# Patient Record
Sex: MALE | Race: WHITE | NOT HISPANIC OR LATINO | URBAN - METROPOLITAN AREA
[De-identification: names, ages, dates, MRNs, and addresses within clinical notes are randomized per-mention and may not be internally consistent; named-entity substitution may affect disease eponyms.]

---

## 2019-06-07 ENCOUNTER — EMERGENCY (EMERGENCY)
Facility: HOSPITAL | Age: 79
LOS: 1 days | Discharge: HOME | End: 2019-06-07
Admitting: EMERGENCY MEDICINE
Payer: COMMERCIAL

## 2019-06-07 VITALS
HEART RATE: 87 BPM | DIASTOLIC BLOOD PRESSURE: 73 MMHG | TEMPERATURE: 98 F | SYSTOLIC BLOOD PRESSURE: 127 MMHG | OXYGEN SATURATION: 97 % | RESPIRATION RATE: 17 BRPM

## 2019-06-07 PROCEDURE — 99282 EMERGENCY DEPT VISIT SF MDM: CPT | Mod: 25

## 2019-06-07 PROCEDURE — 69200 CLEAR OUTER EAR CANAL: CPT | Mod: RT

## 2019-06-07 NOTE — ED ADULT NURSE NOTE - CHPI ED NUR SYMPTOMS NEG
no vomiting/no chills/no decreased eating/drinking/no dizziness/no fever/no weakness/no nausea/no tingling

## 2019-06-07 NOTE — ED PROVIDER NOTE - CLINICAL SUMMARY MEDICAL DECISION MAKING FREE TEXT BOX
77 y/o m presents with rubber foreign body in right ear; removed in ED with no adverse event, no bleeding, TM intact after removal

## 2019-06-07 NOTE — ED ADULT NURSE NOTE - NSIMPLEMENTINTERV_GEN_ALL_ED
Implemented All Universal Safety Interventions:  Mercedes to call system. Call bell, personal items and telephone within reach. Instruct patient to call for assistance. Room bathroom lighting operational. Non-slip footwear when patient is off stretcher. Physically safe environment: no spills, clutter or unnecessary equipment. Stretcher in lowest position, wheels locked, appropriate side rails in place.

## 2019-06-07 NOTE — ED PROVIDER NOTE - NSFOLLOWUPINSTRUCTIONS_ED_ALL_ED_FT
Ear Foreign Body    WHAT YOU NEED TO KNOW:    An ear foreign body is an object that is stuck in your ear. Foreign bodies are usually trapped in the outer ear canal. This is the tube from the opening of your ear to your eardrum.    DISCHARGE INSTRUCTIONS:    Call your doctor if:     You have severe ear pain.      You have pus or blood draining from your ear.      You have a fever or chills.      You have trouble hearing, or you have ringing in your ears.      You have questions or concerns about your condition or care.    Medicines:     Medicines may be give to decrease pain, inflammation, or treat an infection.      Take your medicine as directed. Contact your healthcare provider if you think your medicine is not helping or if you have side effects. Tell him of her if you are allergic to any medicine. Keep a list of the medicines, vitamins, and herbs you take. Include the amounts, and when and why you take them. Bring the list or the pill bottles to follow-up visits. Carry your medicine list with you in case of an emergency.    Follow up with your healthcare provider as directed: Write down your questions so you remember to ask them during your visits.

## 2019-06-07 NOTE — ED ADULT NURSE NOTE - OBJECTIVE STATEMENT
78 year old M patient with part of his hearing aid in his R ear.  NO distress noted.  Breathing easily and unlabored.

## 2019-06-07 NOTE — ED PROVIDER NOTE - OBJECTIVE STATEMENT
79 y/o m presents stating put a new hearing aid in right ear and the rubber outer portion stuck in his canal when he removed it yesterday.  Denies pain, discharge, all other ROS negative.

## 2019-06-07 NOTE — ED PROCEDURE NOTE - CPROC ED FOREIGN BODY DETAIL1
rubber foreign body removed with alligator foreceps from right ear canal/The area was draped and prepped and the anatomic location of the suspected foreign body was explored in a bloodless field.
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
## 2019-06-11 DIAGNOSIS — T16.1XXA FOREIGN BODY IN RIGHT EAR, INITIAL ENCOUNTER: ICD-10-CM
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
## 2020-05-26 NOTE — ED PROCEDURE NOTE - CPROC ED TOLERANCE1
Refill request for:  gabapentin (NEURONTIN) 600 MG tablet 540 tablet 2 10/11/2019     Sig: TAKE 2 TABLETS BY MOUTH 3  TIMES DAILY      Last office visit: 12/16/19   Next office visit: Not Scheduled     Ok to refill?   Only call patient back if there is a problem refilling prescription.    Patient tolerated procedure well.

## 2020-09-10 ENCOUNTER — EMERGENCY (EMERGENCY)
Facility: HOSPITAL | Age: 80
LOS: 1 days | Discharge: HOME | End: 2020-09-10
Attending: EMERGENCY MEDICINE | Admitting: EMERGENCY MEDICINE
Payer: COMMERCIAL

## 2020-09-10 ENCOUNTER — INPATIENT (INPATIENT)
Facility: HOSPITAL | Age: 80
LOS: 0 days | Discharge: HOME | DRG: 696 | End: 2020-09-11
Attending: UROLOGY | Admitting: UROLOGY
Payer: COMMERCIAL

## 2020-09-10 VITALS
WEIGHT: 184.97 LBS | RESPIRATION RATE: 18 BRPM | HEART RATE: 104 BPM | DIASTOLIC BLOOD PRESSURE: 81 MMHG | TEMPERATURE: 98 F | HEIGHT: 71 IN | SYSTOLIC BLOOD PRESSURE: 180 MMHG | OXYGEN SATURATION: 97 %

## 2020-09-10 VITALS
WEIGHT: 184.97 LBS | OXYGEN SATURATION: 96 % | DIASTOLIC BLOOD PRESSURE: 100 MMHG | HEIGHT: 71 IN | TEMPERATURE: 99 F | HEART RATE: 110 BPM | RESPIRATION RATE: 18 BRPM | SYSTOLIC BLOOD PRESSURE: 170 MMHG

## 2020-09-10 VITALS
SYSTOLIC BLOOD PRESSURE: 148 MMHG | OXYGEN SATURATION: 98 % | DIASTOLIC BLOOD PRESSURE: 84 MMHG | TEMPERATURE: 98 F | RESPIRATION RATE: 16 BRPM

## 2020-09-10 VITALS
HEIGHT: 71 IN | TEMPERATURE: 98 F | DIASTOLIC BLOOD PRESSURE: 107 MMHG | OXYGEN SATURATION: 96 % | RESPIRATION RATE: 18 BRPM | HEART RATE: 120 BPM | SYSTOLIC BLOOD PRESSURE: 159 MMHG | WEIGHT: 184.97 LBS

## 2020-09-10 VITALS
HEART RATE: 79 BPM | TEMPERATURE: 98 F | OXYGEN SATURATION: 96 % | SYSTOLIC BLOOD PRESSURE: 119 MMHG | DIASTOLIC BLOOD PRESSURE: 64 MMHG | RESPIRATION RATE: 18 BRPM

## 2020-09-10 DIAGNOSIS — R33.9 RETENTION OF URINE, UNSPECIFIED: ICD-10-CM

## 2020-09-10 DIAGNOSIS — R31.9 HEMATURIA, UNSPECIFIED: ICD-10-CM

## 2020-09-10 DIAGNOSIS — I10 ESSENTIAL (PRIMARY) HYPERTENSION: ICD-10-CM

## 2020-09-10 DIAGNOSIS — N40.1 BENIGN PROSTATIC HYPERPLASIA WITH LOWER URINARY TRACT SYMPTOMS: ICD-10-CM

## 2020-09-10 LAB
ALBUMIN SERPL ELPH-MCNC: 4 G/DL — SIGNIFICANT CHANGE UP (ref 3.3–5)
ALBUMIN SERPL ELPH-MCNC: 4.4 G/DL — SIGNIFICANT CHANGE UP (ref 3.3–5)
ALP SERPL-CCNC: 77 U/L — SIGNIFICANT CHANGE UP (ref 40–120)
ALP SERPL-CCNC: 96 U/L — SIGNIFICANT CHANGE UP (ref 40–120)
ALT FLD-CCNC: 32 U/L — SIGNIFICANT CHANGE UP (ref 10–45)
ALT FLD-CCNC: 34 U/L — SIGNIFICANT CHANGE UP (ref 10–45)
ANION GAP SERPL CALC-SCNC: 14 MMOL/L — SIGNIFICANT CHANGE UP (ref 5–17)
ANION GAP SERPL CALC-SCNC: 17 MMOL/L — SIGNIFICANT CHANGE UP (ref 5–17)
APPEARANCE UR: ABNORMAL
APPEARANCE UR: ABNORMAL
APTT BLD: 31 SEC — SIGNIFICANT CHANGE UP (ref 27.5–35.5)
AST SERPL-CCNC: 41 U/L — HIGH (ref 10–40)
AST SERPL-CCNC: 45 U/L — HIGH (ref 10–40)
BACTERIA # UR AUTO: PRESENT /HPF
BASOPHILS # BLD AUTO: 0 K/UL — SIGNIFICANT CHANGE UP (ref 0–0.2)
BASOPHILS # BLD AUTO: 0.05 K/UL — SIGNIFICANT CHANGE UP (ref 0–0.2)
BASOPHILS NFR BLD AUTO: 0 % — SIGNIFICANT CHANGE UP (ref 0–2)
BASOPHILS NFR BLD AUTO: 0.6 % — SIGNIFICANT CHANGE UP (ref 0–2)
BILIRUB SERPL-MCNC: 0.6 MG/DL — SIGNIFICANT CHANGE UP (ref 0.2–1.2)
BILIRUB SERPL-MCNC: 1 MG/DL — SIGNIFICANT CHANGE UP (ref 0.2–1.2)
BILIRUB UR-MCNC: ABNORMAL
BILIRUB UR-MCNC: NEGATIVE — SIGNIFICANT CHANGE UP
BUN SERPL-MCNC: 31 MG/DL — HIGH (ref 7–23)
BUN SERPL-MCNC: 35 MG/DL — HIGH (ref 7–23)
CALCIUM SERPL-MCNC: 10.4 MG/DL — SIGNIFICANT CHANGE UP (ref 8.4–10.5)
CALCIUM SERPL-MCNC: 10.5 MG/DL — SIGNIFICANT CHANGE UP (ref 8.4–10.5)
CHLORIDE SERPL-SCNC: 102 MMOL/L — SIGNIFICANT CHANGE UP (ref 96–108)
CHLORIDE SERPL-SCNC: 105 MMOL/L — SIGNIFICANT CHANGE UP (ref 96–108)
CO2 SERPL-SCNC: 21 MMOL/L — LOW (ref 22–31)
CO2 SERPL-SCNC: 23 MMOL/L — SIGNIFICANT CHANGE UP (ref 22–31)
COLOR SPEC: ABNORMAL
COLOR SPEC: ABNORMAL
CREAT SERPL-MCNC: 1.35 MG/DL — HIGH (ref 0.5–1.3)
CREAT SERPL-MCNC: 1.4 MG/DL — HIGH (ref 0.5–1.3)
DIFF PNL FLD: ABNORMAL
DIFF PNL FLD: ABNORMAL
EOSINOPHIL # BLD AUTO: 0.09 K/UL — SIGNIFICANT CHANGE UP (ref 0–0.5)
EOSINOPHIL # BLD AUTO: 0.1 K/UL — SIGNIFICANT CHANGE UP (ref 0–0.5)
EOSINOPHIL NFR BLD AUTO: 0.9 % — SIGNIFICANT CHANGE UP (ref 0–6)
EOSINOPHIL NFR BLD AUTO: 1.1 % — SIGNIFICANT CHANGE UP (ref 0–6)
EPI CELLS # UR: SIGNIFICANT CHANGE UP /HPF (ref 0–5)
GLUCOSE SERPL-MCNC: 124 MG/DL — HIGH (ref 70–99)
GLUCOSE SERPL-MCNC: 143 MG/DL — HIGH (ref 70–99)
GLUCOSE UR QL: NEGATIVE — SIGNIFICANT CHANGE UP
GLUCOSE UR QL: NEGATIVE — SIGNIFICANT CHANGE UP
HCT VFR BLD CALC: 39.9 % — SIGNIFICANT CHANGE UP (ref 39–50)
HCT VFR BLD CALC: 41.9 % — SIGNIFICANT CHANGE UP (ref 39–50)
HGB BLD-MCNC: 13.5 G/DL — SIGNIFICANT CHANGE UP (ref 13–17)
HGB BLD-MCNC: 14.4 G/DL — SIGNIFICANT CHANGE UP (ref 13–17)
IMM GRANULOCYTES NFR BLD AUTO: 0.5 % — SIGNIFICANT CHANGE UP (ref 0–1.5)
INR BLD: 1.1 — SIGNIFICANT CHANGE UP (ref 0.88–1.16)
KETONES UR-MCNC: ABNORMAL MG/DL
KETONES UR-MCNC: NEGATIVE — SIGNIFICANT CHANGE UP
LEUKOCYTE ESTERASE UR-ACNC: ABNORMAL
LEUKOCYTE ESTERASE UR-ACNC: NEGATIVE — SIGNIFICANT CHANGE UP
LYMPHOCYTES # BLD AUTO: 0.44 K/UL — LOW (ref 1–3.3)
LYMPHOCYTES # BLD AUTO: 1.28 K/UL — SIGNIFICANT CHANGE UP (ref 1–3.3)
LYMPHOCYTES # BLD AUTO: 14.4 % — SIGNIFICANT CHANGE UP (ref 13–44)
LYMPHOCYTES # BLD AUTO: 4.3 % — LOW (ref 13–44)
MCHC RBC-ENTMCNC: 32.9 PG — SIGNIFICANT CHANGE UP (ref 27–34)
MCHC RBC-ENTMCNC: 33 PG — SIGNIFICANT CHANGE UP (ref 27–34)
MCHC RBC-ENTMCNC: 33.8 GM/DL — SIGNIFICANT CHANGE UP (ref 32–36)
MCHC RBC-ENTMCNC: 34.4 GM/DL — SIGNIFICANT CHANGE UP (ref 32–36)
MCV RBC AUTO: 95.7 FL — SIGNIFICANT CHANGE UP (ref 80–100)
MCV RBC AUTO: 97.6 FL — SIGNIFICANT CHANGE UP (ref 80–100)
MONOCYTES # BLD AUTO: 0.27 K/UL — SIGNIFICANT CHANGE UP (ref 0–0.9)
MONOCYTES # BLD AUTO: 0.69 K/UL — SIGNIFICANT CHANGE UP (ref 0–0.9)
MONOCYTES NFR BLD AUTO: 2.6 % — SIGNIFICANT CHANGE UP (ref 2–14)
MONOCYTES NFR BLD AUTO: 7.8 % — SIGNIFICANT CHANGE UP (ref 2–14)
NEUTROPHILS # BLD AUTO: 6.72 K/UL — SIGNIFICANT CHANGE UP (ref 1.8–7.4)
NEUTROPHILS # BLD AUTO: 9.43 K/UL — HIGH (ref 1.8–7.4)
NEUTROPHILS NFR BLD AUTO: 75.6 % — SIGNIFICANT CHANGE UP (ref 43–77)
NEUTROPHILS NFR BLD AUTO: 87 % — HIGH (ref 43–77)
NITRITE UR-MCNC: NEGATIVE — SIGNIFICANT CHANGE UP
NITRITE UR-MCNC: SIGNIFICANT CHANGE UP
NRBC # BLD: 0 /100 WBCS — SIGNIFICANT CHANGE UP (ref 0–0)
PH UR: 6 — SIGNIFICANT CHANGE UP (ref 5–8)
PH UR: 6.5 — SIGNIFICANT CHANGE UP (ref 5–8)
PLATELET # BLD AUTO: 150 K/UL — SIGNIFICANT CHANGE UP (ref 150–400)
PLATELET # BLD AUTO: 175 K/UL — SIGNIFICANT CHANGE UP (ref 150–400)
POTASSIUM SERPL-MCNC: 3.7 MMOL/L — SIGNIFICANT CHANGE UP (ref 3.5–5.3)
POTASSIUM SERPL-MCNC: 4.1 MMOL/L — SIGNIFICANT CHANGE UP (ref 3.5–5.3)
POTASSIUM SERPL-SCNC: 3.7 MMOL/L — SIGNIFICANT CHANGE UP (ref 3.5–5.3)
POTASSIUM SERPL-SCNC: 4.1 MMOL/L — SIGNIFICANT CHANGE UP (ref 3.5–5.3)
PROT SERPL-MCNC: 6.5 G/DL — SIGNIFICANT CHANGE UP (ref 6–8.3)
PROT SERPL-MCNC: 6.7 G/DL — SIGNIFICANT CHANGE UP (ref 6–8.3)
PROT UR-MCNC: 100 MG/DL
PROT UR-MCNC: >=300 MG/DL
PROTHROM AB SERPL-ACNC: 13.1 SEC — SIGNIFICANT CHANGE UP (ref 10.6–13.6)
RBC # BLD: 4.09 M/UL — LOW (ref 4.2–5.8)
RBC # BLD: 4.38 M/UL — SIGNIFICANT CHANGE UP (ref 4.2–5.8)
RBC # FLD: 13.4 % — SIGNIFICANT CHANGE UP (ref 10.3–14.5)
RBC # FLD: 13.8 % — SIGNIFICANT CHANGE UP (ref 10.3–14.5)
RBC CASTS # UR COMP ASSIST: ABNORMAL /HPF
SARS-COV-2 RNA SPEC QL NAA+PROBE: SIGNIFICANT CHANGE UP
SODIUM SERPL-SCNC: 140 MMOL/L — SIGNIFICANT CHANGE UP (ref 135–145)
SODIUM SERPL-SCNC: 142 MMOL/L — SIGNIFICANT CHANGE UP (ref 135–145)
SP GR SPEC: 1.01 — SIGNIFICANT CHANGE UP (ref 1–1.03)
SP GR SPEC: <=1.005 — SIGNIFICANT CHANGE UP (ref 1–1.03)
UROBILINOGEN FLD QL: 0.2 E.U./DL — SIGNIFICANT CHANGE UP
UROBILINOGEN FLD QL: SIGNIFICANT CHANGE UP E.U./DL
WBC # BLD: 10.23 K/UL — SIGNIFICANT CHANGE UP (ref 3.8–10.5)
WBC # BLD: 8.88 K/UL — SIGNIFICANT CHANGE UP (ref 3.8–10.5)
WBC # FLD AUTO: 10.23 K/UL — SIGNIFICANT CHANGE UP (ref 3.8–10.5)
WBC # FLD AUTO: 8.88 K/UL — SIGNIFICANT CHANGE UP (ref 3.8–10.5)
WBC UR QL: < 5 /HPF — SIGNIFICANT CHANGE UP

## 2020-09-10 PROCEDURE — 36415 COLL VENOUS BLD VENIPUNCTURE: CPT

## 2020-09-10 PROCEDURE — 80053 COMPREHEN METABOLIC PANEL: CPT

## 2020-09-10 PROCEDURE — 85025 COMPLETE CBC W/AUTO DIFF WBC: CPT

## 2020-09-10 PROCEDURE — 99284 EMERGENCY DEPT VISIT MOD MDM: CPT | Mod: 25

## 2020-09-10 PROCEDURE — 81001 URINALYSIS AUTO W/SCOPE: CPT

## 2020-09-10 PROCEDURE — 99283 EMERGENCY DEPT VISIT LOW MDM: CPT

## 2020-09-10 PROCEDURE — 99285 EMERGENCY DEPT VISIT HI MDM: CPT

## 2020-09-10 PROCEDURE — 51702 INSERT TEMP BLADDER CATH: CPT

## 2020-09-10 PROCEDURE — 99284 EMERGENCY DEPT VISIT MOD MDM: CPT

## 2020-09-10 PROCEDURE — 87086 URINE CULTURE/COLONY COUNT: CPT

## 2020-09-10 RX ORDER — CEFTRIAXONE 500 MG/1
1000 INJECTION, POWDER, FOR SOLUTION INTRAMUSCULAR; INTRAVENOUS EVERY 24 HOURS
Refills: 0 | Status: DISCONTINUED | OUTPATIENT
Start: 2020-09-11 | End: 2020-09-11

## 2020-09-10 RX ORDER — LIDOCAINE HCL 20 MG/ML
10 VIAL (ML) INJECTION ONCE
Refills: 0 | Status: COMPLETED | OUTPATIENT
Start: 2020-09-10 | End: 2020-09-10

## 2020-09-10 RX ORDER — DIAZEPAM 5 MG
5 TABLET ORAL EVERY 8 HOURS
Refills: 0 | Status: DISCONTINUED | OUTPATIENT
Start: 2020-09-10 | End: 2020-09-11

## 2020-09-10 RX ORDER — CEFTRIAXONE 500 MG/1
1000 INJECTION, POWDER, FOR SOLUTION INTRAMUSCULAR; INTRAVENOUS ONCE
Refills: 0 | Status: COMPLETED | OUTPATIENT
Start: 2020-09-10 | End: 2020-09-10

## 2020-09-10 RX ORDER — ACETAMINOPHEN 500 MG
650 TABLET ORAL EVERY 6 HOURS
Refills: 0 | Status: DISCONTINUED | OUTPATIENT
Start: 2020-09-10 | End: 2020-09-11

## 2020-09-10 RX ORDER — ALLOPURINOL 300 MG
300 TABLET ORAL DAILY
Refills: 0 | Status: DISCONTINUED | OUTPATIENT
Start: 2020-09-11 | End: 2020-09-11

## 2020-09-10 RX ORDER — ATORVASTATIN CALCIUM 80 MG/1
20 TABLET, FILM COATED ORAL AT BEDTIME
Refills: 0 | Status: DISCONTINUED | OUTPATIENT
Start: 2020-09-10 | End: 2020-09-11

## 2020-09-10 RX ORDER — OXYBUTYNIN CHLORIDE 5 MG
5 TABLET ORAL EVERY 8 HOURS
Refills: 0 | Status: DISCONTINUED | OUTPATIENT
Start: 2020-09-10 | End: 2020-09-11

## 2020-09-10 RX ORDER — SODIUM CHLORIDE 9 MG/ML
1000 INJECTION INTRAMUSCULAR; INTRAVENOUS; SUBCUTANEOUS ONCE
Refills: 0 | Status: COMPLETED | OUTPATIENT
Start: 2020-09-10 | End: 2020-09-10

## 2020-09-10 RX ORDER — LOSARTAN POTASSIUM 100 MG/1
25 TABLET, FILM COATED ORAL DAILY
Refills: 0 | Status: DISCONTINUED | OUTPATIENT
Start: 2020-09-11 | End: 2020-09-11

## 2020-09-10 RX ADMIN — Medication 10 MILLILITER(S): at 14:25

## 2020-09-10 RX ADMIN — ATORVASTATIN CALCIUM 20 MILLIGRAM(S): 80 TABLET, FILM COATED ORAL at 21:52

## 2020-09-10 RX ADMIN — CEFTRIAXONE 100 MILLIGRAM(S): 500 INJECTION, POWDER, FOR SOLUTION INTRAMUSCULAR; INTRAVENOUS at 16:59

## 2020-09-10 RX ADMIN — SODIUM CHLORIDE 1000 MILLILITER(S): 9 INJECTION INTRAMUSCULAR; INTRAVENOUS; SUBCUTANEOUS at 16:59

## 2020-09-10 RX ADMIN — Medication 5 MILLIGRAM(S): at 19:22

## 2020-09-10 RX ADMIN — Medication 5 MILLIGRAM(S): at 20:46

## 2020-09-10 NOTE — ED PROVIDER NOTE - CLINICAL SUMMARY MEDICAL DECISION MAKING FREE TEXT BOX
In summary, patient is an 79yo male with PMHx significant for BPH, HTN, HLD, gout presenting to ED due to inability to urinate x6hrs concerning for urinary retention in the setting of acute BPH exacerbation +/- infectious process.  V/S significant for tachycardia and HTN.  Patient will undergo initial catheterization in ED for symptom relief, labs ordered, will evaluate for possible renal damage.  Further consultation/management as appropriate.

## 2020-09-10 NOTE — ED ADULT NURSE NOTE - COVID-19 RESULT
Pt reports intermittent chest pain for 2 days. Denies n/v. Reports current URI and fatigue. Recent travel to [facility]. Hx Anxiety. Took xanax PTA. NEGATIVE

## 2020-09-10 NOTE — ED PROVIDER NOTE - OBJECTIVE STATEMENT
The pt is a 81 y/o M, who returns to ED c/o urinary retention - seen yest, had so placed, it was leaking so came back to ED this am but left to go see his urologist - so was taken out and new one could not be placed, pt returns c/o lower abd pain and inability to urinate - states "urology is expecting me". Denies fevers, chills, cp, sob, n/v/d, flank pain

## 2020-09-10 NOTE — ED ADULT NURSE REASSESSMENT NOTE - NS ED NURSE REASSESS COMMENT FT1
Catheter placement completed by urology after multiple attempts. Pt resting quietly, no s/s distress noted.

## 2020-09-10 NOTE — H&P ADULT - PROBLEM SELECTOR PLAN 1
-admitted to   -24fr 3 way catheter in, CBI on  -monitor urine status  -monitor labs: H/H  -pain control  -bladder spasm regimen  -diet: Reg  -no anticoagulation at this time: ICDs for DVT prophylaxis  -Ceftriaxone

## 2020-09-10 NOTE — ED PROVIDER NOTE - OBJECTIVE STATEMENT
Patient is an 81yo male with PMHx significant for BPH, HTN, HLD, gout presenting to ED due to inability to urinate x6hrs.  Patient states that he was playing golf this PM when he began to experience worsening bloating/distension of the lower abdomen and urge to urinate with inability to do so.  Patient states his last urination was at 5PM this evening (8hrs prior to ED visit), endorses some dysuria and denies hematuria, penile discharge/bleeding, constipation, flank pain, N/V/D, fevers, chills.  Patient denies chest pain, SOB, change in urine/stool frequency/color, hematuria/hematochezia/melena, focal neuromuscular deficits, numbness/paresthesias, dizziness/syncope/falls/head or other trauma.  Recently tested negative for COVID, adherent with all meds.    Urologist: Dr. Dominik Last ([phone]) Patient is an 81yo male with PMHx significant for BPH, HTN, HLD, gout presenting to ED due to inability to urinate x 6hrs.  Patient states that he was playing golf this PM when he began to experience worsening bloating/distension of the lower abdomen and urge to urinate with inability to do so.  Patient states his last urination was at 5PM this evening (8hrs prior to ED visit), endorses some dysuria prior to onset of retention and denies hematuria, penile discharge/bleeding, constipation, flank pain, N/V/D, fevers, chills.  Patient denies chest pain, SOB, change in urine/stool frequency/color, hematuria/hematochezia/melena, focal neuromuscular deficits, numbness/paresthesias, dizziness/syncope/falls/head or other trauma.  Recently tested negative for COVID, adherent with all meds.    Urologist: Dr. Dominik Last ([phone])

## 2020-09-10 NOTE — ED PROVIDER NOTE - PHYSICAL EXAMINATION
VITAL SIGNS: I have reviewed nursing notes and confirm.  CONSTITUTIONAL: Well-developed; well-nourished; in no acute distress.   SKIN:  warm and dry, no acute rash.   HEAD:  normocephalic, atraumatic.  EYES: EOM intact; conjunctiva and sclera clear.  ENT: No nasal discharge; airway clear.   NECK: Supple; non tender.  CARD: S1, S2 normal; no murmurs, gallops, or rubs. Regular rate and rhythm.   RESP:  Clear to auscultation b/l, no wheezes, rales or rhonchi.  ABD: Normal bowel sounds; soft; non-distended; non-tender; no guarding/ rebound.  : + so catheter in place, + blood at meatus.   EXT: Normal ROM. Mild lower ext edema. 2+ pulses to b/l ue/le.  NEURO: Alert, oriented, grossly unremarkable  PSYCH: Cooperative, mood and affect appropriate.

## 2020-09-10 NOTE — H&P ADULT - NSHPLABSRESULTS_GEN_ALL_CORE
2019  EMPLOYEE INFORMATION: EMPLOYER INFORMATION:   NAME: Eloina SALGUERO   : 1966 [phone]   DATE OF INJURY/EVENT: 3/27/2019          Location: [facility]   Treating Provider: Víctor Vogel MD,MPH  Time In:  10:10 AM Time Out:  10:36 AM      DIAGNOSIS:   1. Contusion of left forearm, subsequent encounter      STATUS: This injury is determined to be WORK RELATED.  RETURN TO WORK:  Employee may return to work with restrictions.     Return Date: 2019          RESTRICTIONS:   Restrictions are to be followed at work and at home.  Restrictions are in effect until next follow-up visit.  Right hand work only, no left hand use  TREATMENT PLAN:  Medications for this injury/condition:   Topical voltaren 1% gel  Wrist splint, forearm wrap  Diagnostic Testing:   XR FOREARM 2 VW LEFT  XR WRIST 3+ VW LEFT  Instructions:   Wear wrist splint and forearm wrap. Use the voltaren gel daily for inflammation control  NEXT RETURN VISIT: one week   04 11 19 @ 1045AM    Thank you for the privilege of providing medical care for this injury/condition.  If there are any questions, please call the clinic at Dept: [phone].    Electronically signed on 2019 by:   Víctor Vogel MD,MPH   Medical Director  [facility]    
VITALS:    T(F): 98.4 (09-10-20 @ 16:50), Max: 98.7 (09-10-20 @ 08:00)  HR: 83 (09-10-20 @ 16:50) (79 - 120)  BP: 136/82 (09-10-20 @ 16:50) (119/64 - 180/81)  RR: 15 (09-10-20 @ 16:50) (15 - 18)  SpO2: 97% (09-10-20 @ 16:50) (95% - 98%)  Wt(kg): --    I&O's Detail    10 Sep 2020 07:01  -  10 Sep 2020 18:39  --------------------------------------------------------  IN:    Continuous Bladder Irrigation: 3000 mL  Total IN: 3000 mL    OUT:    Continuous Bladder Irrigation: 3300 mL  Total OUT: 3300 mL    Total NET: -300 mL    LABS:                        13.5   10. )-----------( 150      ( 10 Sep 2020 15:30 )             39.9     09-10    142  |  105  |  31<H>  ----------------------------<  143<H>  3.7   |  23  |  1.40<H>    Ca    10.4      10 Sep 2020 15:30    TPro  6.5  /  Alb  4.0  /  TBili  1.0  /  DBili  x   /  AST  45<H>  /  ALT  32  /  AlkPhos  77  09-10    PT/INR - ( 10 Sep 2020 15:30 )   PT: 13.1 sec;   INR: 1.10          PTT - ( 10 Sep 2020 15:30 )  PTT:31.0 sec  Urinalysis Basic - ( 10 Sep 2020 15:40 )    Color: Red / Appearance: Turbid / S.015 / pH: x  Gluc: x / Ketone: Trace mg/dL  / Bili: Small / Urobili: See note E.U./dL   Blood: x / Protein: >=300 mg/dL / Nitrite: See note   Leuk Esterase: Moderate / RBC: Many /HPF / WBC < 5 /HPF   Sq Epi: x / Non Sq Epi: x / Bacteria: Rare /HPF

## 2020-09-10 NOTE — ED PROVIDER NOTE - NSFOLLOWUPINSTRUCTIONS_ED_ALL_ED_FT
Follow up with Dr. Last now.  Return to er for any new or worsening symptoms (fever, chills, abdominal pain, nausea, vomiting, difficulty urinating...)    EnglishSpanish    Indwelling Urinary Catheter Care, Adult  An indwelling urinary catheter is a thin tube that is put into your bladder. The tube helps to drain pee (urine) out of your body. The tube goes in through your urethra. Your urethra is where pee comes out of your body. Your pee will come out through the catheter, then it will go into a bag (drainage bag).  Take good care of your catheter so it will work well.  How to wear your catheter and bag  Supplies needed     Sticky tape (adhesive tape) or a leg strap.Alcohol wipe or soap and water (if you use tape).A clean towel (if you use tape).Large overnight bag.Smaller bag (leg bag).Wearing your catheter     Attach your catheter to your leg with tape or a leg strap.  Make sure the catheter is not pulled tight.If a leg strap gets wet, take it off and put on a dry strap.If you use tape to hold the bag on your leg:  Use an alcohol wipe or soap and water to wash your skin where the tape made it sticky before.Use a clean towel to pat-dry that skin.Use new tape to make the bag stay on your leg.Wearing your bags    You should have been given a large overnight bag.  You may wear the overnight bag in the day or night.Always have the overnight bag lower than your bladder. Do not let the bag touch the floor.Before you go to sleep, put a clean plastic bag in a wastebasket. Then hang the overnight bag inside the wastebasket.You should also have a smaller leg bag that fits under your clothes.  Always wear the leg bag below your knee.Do not wear your leg bag at night.How to care for your skin and catheter  Supplies needed     A clean washcloth.Water and mild soap.A clean towel.Caring for your skin and catheter         Clean the skin around your catheter every day:  Wash your hands with soap and water.Wet a clean washcloth in warm water and mild soap.Clean the skin around your urethra.  If you are female:  Gently spread the folds of skin around your vagina (labia).With the washcloth in your other hand, wipe the inner side of your labia on each side. Wipe from front to back.If you are male:  Pull back any skin that covers the end of your penis (foreskin).With the washcloth in your other hand, wipe your penis in small circles. Start wiping at the tip of your penis, then move away from the catheter.Move the foreskin back in place, if needed.With your free hand, hold the catheter close to where it goes into your body.  Keep holding the catheter during cleaning so it does not get pulled out.With the washcloth in your other hand, clean the catheter.  Only wipe downward on the catheter.Do not wipe upward toward your body. Doing this may push germs into your urethra and cause infection.Use a clean towel to pat-dry the catheter and the skin around it. Make sure to wipe off all soap.Wash your hands with soap and water.Shower every day. Do not take baths.Do not use cream, ointment, or lotion on the area where the catheter goes into your body, unless your doctor tells you to.Do not use powders, sprays, or lotions on your genital area.Check your skin around the catheter every day for signs of infection. Check for:  Redness, swelling, or pain.Fluid or blood.Warmth.Pus or a bad smell.How to empty the bag  Supplies needed     Rubbing alcohol.Gauze pad or cotton ball.Tape or a leg strap.Emptying the bag     Pour the pee out of your bag when it is ?–½ full, or at least 2–3 times a day. Do this for your overnight bag and your leg bag.  Wash your hands with soap and water.  Separate (detach) the bag from your leg.  Hold the bag over the toilet or a clean pail. Keep the bag lower than your hips and bladder. This is so the pee (urine) does not go back into the tube.  Open the pour spout. It is at the bottom of the bag.  Empty the pee into the toilet or pail. Do not let the pour spout touch any surface.  Put rubbing alcohol on a gauze pad or cotton ball.  Use the gauze pad or cotton ball to clean the pour spout.  Close the pour spout.  Attach the bag to your leg with tape or a leg strap.  Wash your hands with soap and water.  Follow instructions for cleaning the drainage bag:  From the product maker.As told by your doctor.How to change the bag  Supplies needed     Alcohol wipes.A clean bag.Tape or a leg strap.Changing the bag     Replace your bag when it starts to leak, smell bad, or look dirty.  Wash your hands with soap and water.  Separate the dirty bag from your leg.  Pinch the catheter with your fingers so that pee does not spill out.  Separate the catheter tube from the bag tube where these tubes connect (at the connection valve). Do not let the tubes touch any surface.  Clean the end of the catheter tube with an alcohol wipe. Use a different alcohol wipe to clean the end of the bag tube.  Connect the catheter tube to the tube of the clean bag.  Attach the clean bag to your leg with tape or a leg strap. Do not make the bag tight on your leg.  Wash your hands with soap and water.  General rules     Never pull on your catheter. Never try to take it out. Doing that can hurt you.Always wash your hands before and after you touch your catheter or bag. Use a mild, fragrance-free soap. If you do not have soap and water, use hand .Always make sure there are no twists or bends (kinks) in the catheter tube.Always make sure there are no leaks in the catheter or bag.Drink enough fluid to keep your pee pale yellow.Do not take baths, swim, or use a hot tub.If you are female, wipe from front to back after you poop (have a bowel movement).Contact a doctor if:  Your pee is cloudy.Your pee smells worse than usual.Your catheter gets clogged.Your catheter leaks.Your bladder feels full.Get help right away if:  You have redness, swelling, or pain where the catheter goes into your body.You have fluid, blood, pus, or a bad smell coming from the area where the catheter goes into your body.Your skin feels warm where the catheter goes into your body.You have a fever.You have pain in your:  Belly (abdomen).Legs.Lower back.Bladder.You see blood in the catheter.Your pee is pink or red.You feel sick to your stomach (nauseous).You throw up (vomit).You have chills.Your pee is not draining into the bag.Your catheter gets pulled out.Summary  An indwelling urinary catheter is a thin tube that is placed into the bladder to help drain pee (urine) out of the body. The catheter is placed into the part of the body that drains pee from the bladder (urethra).Taking good care of your catheter will keep it working properly and help prevent problems.Always wash your hands before and after touching your catheter or bag.Never pull on your catheter or try to take it out.This information is not intended to replace advice given to you by your health care provider. Make sure you discuss any questions you have with your health care provider.    Document Released: 04/14/2014 Document Revised: 04/10/2020 Document Reviewed: 08/03/2018  Elsevier Patient Education © 2020 [employer]

## 2020-09-10 NOTE — ED ADULT NURSE REASSESSMENT NOTE - NS ED NURSE REASSESS COMMENT FT1
0209-Attempted Folet cather x 2 with 16 FR and 14 FR (coudet) without success. Clots noted in tubing, no UOP. Attempted to flush catheters without success. Physician notified, urology consulted. 0209-Attempted Prado cather x 2 with 16 FR and 14 FR (coudet) without success. Clots noted in tubing, no UOP. Attempted to flush catheters without success. Physician notified, urology consulted.

## 2020-09-10 NOTE — H&P ADULT - NSHPPHYSICALEXAM_GEN_ALL_CORE
PE    GEN: NAD  Abdominal: soft, nontender, non distended, no CVAT  : 24fr 3 way, CBI on, urine output pink yellow clear

## 2020-09-10 NOTE — ED PROVIDER NOTE - ATTENDING CONTRIBUTION TO CARE
I discussed the plan of care of the patient directly with the PA and examined the patient while in the Emergency Department. I agree with the HPI and PE as documented by the PA.  Pt seen by me earlier today for leakage of urine around so catheter placed last night in ED for urinary retention. + hematuria. Pt f/u with his urologist, Dr. Last, who removed so, however pt failed tov and unable to reinsert so. + bladder distention. No f/c, flank pain, n/v... Afebrile. HDS. WNWD m in nad. + s1, s2, rrr. Lungs cta b/l. Abd soft, + distended bladder, no guarding/ rebound. No cvat. Urology consult obtained; pt seen by Dr. Rose in ed and 24Fr so successfully placed. Pt to be admitted to urology svc for cbi.

## 2020-09-10 NOTE — ED PROVIDER NOTE - ATTENDING CONTRIBUTION TO CARE
81 yo male with PMH significant for BPH, HTN, HLD, gout presenting to ED due to inability to urinate x 6hrs.  Patient states that he was playing golf this PM when he began to experience worsening bloating/distension of the lower abdomen and urge to urinate with inability to do so.  Patient states his last urination was at 5PM this evening (8hrs prior to ED visit), endorses some dysuria prior to onset of retention and denies hematuria, penile discharge/bleeding, constipation, flank pain, N/V/D, fevers, chills.  Patient denies chest pain, SOB, change in urine/stool frequency/color, hematuria/hematochezia/melena, focal neuromuscular deficits, numbness/paresthesias, dizziness/syncope/falls/head or other trauma.  Recently tested negative for COVID, adherent with all meds. Pt's urologist: Dr. Dominik Last. Pt AAO, NAD, RRR, CTA b/l, abd: (+) supra-pubic abd distension and TTP. labs noted. Attempt at so placement by RN X 2 unsuccessful with blood clots noted. Urology consulted and in ED to see pt and so placed by Urology with 1.2 liters bloody urine output. Pt dcd with so catheter/ leg bag and outpt f/up with Dr. Last in 2 days. Labs/ studies noted. Stable for dc. Return precautions given.

## 2020-09-10 NOTE — ED PROVIDER NOTE - OBJECTIVE STATEMENT
Pt is an 81yo m, h/o htn, hld, bph, seen in ed last night for urinary retention and had so catheter placed, returns to ed for leakage of urine around catheter with hematuria after dc. Bladder feels distended. No associated abd pain, flank pain, fever, chills, nausea, vomiting, chest pain, sob... Pt is not on any blood thinners.

## 2020-09-10 NOTE — ED PROVIDER NOTE - CARE PROVIDER_API CALL
JASON CLARKE  Urology  [address] [id number]  Phone: [phone]  Fax: [phone]  Follow Up Time:

## 2020-09-10 NOTE — ED ADULT TRIAGE NOTE - CHIEF COMPLAINT QUOTE
pt arriving to ED for c/c urinary retention. pt states he was seen at [facility] yesterday for urinary retention, here for worsening pain

## 2020-09-10 NOTE — H&P ADULT - HISTORY OF PRESENT ILLNESS
80 year old male with history of BPH re-presents to the ED complaining of urinary retention. Patient was seen last night for same complaints of urgency, retention and urinary discomfort. Patient had so catheter put in the ED with resulting minimal hematuria, upon minimal irrigation 800+cc of clear urine was expelled and patient was subsequently discharged home with catheter. He reports that he began to experience leaking around catheter leading him to present to the ED this morning but after speaking to his urologist Dr. Last he decided to follow up with urologist and left the ED this am without urology consultation. He now returns after a failed trial of void in office with inability to place catheter in outpatient setting. He reports to suprapubic discomfort, urgency and generalized discomfort. He denies any fever, chills, chest pain, nausea, vomiting, dysuria.    Examined by  with Dr Rose. 24fr 3 way hematuria catheter inserted using sterile method with subsequent manual irrigation. Hematuria cleared momentarily however prolonged hematuria appreciated. Subsequent decision to start CBI. Urine color improved vastly with CBI on.

## 2020-09-10 NOTE — ED PROVIDER NOTE - NSFOLLOWUPINSTRUCTIONS_ED_ALL_ED_FT
Please follow up with Dr. Last as scheduled later today. Return to the ER if you develop any concerning symptoms.    Acute Urinary Retention, Male     Acute urinary retention is a condition in which a person is unable to pass urine. This can last for a short time or for a long time. If left untreated, it can result in kidney damage or other serious complications.  What are the causes?  This condition may be caused by:  Obstruction or narrowing of the tube that drains the bladder (urethra). This may be caused by surgery or problems with nearby organs, such as the prostate gland, which can press or squeeze the urethra.Problems with the nerves in the bladder. These can be caused by diseases, such as multiple sclerosis, or by spinal cord injuries.Certain medicines.Tumors in the area of the pelvis, bladder, or urethra.Diabetes.Degenerative cognitive conditions such as delirium or dementia.Bladder or urinary tract infection.Constipation.Blood in the urine (hematuria).Injury to the bladder or urethra. Psychological (psychogenic) conditions. Someone may hold his urine due to trauma or because he does not want to use the bathroom.What increases the risk?  This condition is more likely to develop in older men. As men age, their prostate may become larger and may start pressing or squeezing on the bladder or the urethra.  What are the signs or symptoms?  Symptoms of this condition include:  Trouble urinating.Pain in the lower abdomen.Symptoms usually come on slowly over a long period of time.  How is this diagnosed?  This condition is diagnosed based on a physical exam and a medical history. You may also have other tests, including:  An ultrasound of the bladder or kidneys or both.Blood tests.A urine analysis.Additional tests may be needed such as an MRI, kidney, or bladder function tests.How is this treated?  Treatment for this condition may include:  Medicines.Placing a thin, sterile tube (catheter) into the bladder to drain urine out of the body. This is called an indwelling urinary catheter. After being inserted, the catheter is held in place with a small balloon that is filled with sterile water. Urine drains from the catheter into a collection bag outside of the body.Behavioral therapy.Treatment for any underlying conditions.If needed, you may be treated in the hospital for kidney function problems or to manage other complications.Follow these instructions at home:  Take over-the-counter and prescription medicines only as told by your health care provider. Avoid certain medicines, such as decongestants, antihistamines, and some prescription medicines. Do not take any medicine unless your health care provider has approved.If you were given an indwelling urinary catheter, take care of it as told by your health care provider.Drink enough fluid to keep your urine clear or pale yellow.If you were prescribed an antibiotic, take it as told by your health care provider. Do not stop taking the antibiotic even if you start to feel better.Do not use any products that contain nicotine or tobacco, such as cigarettes and e-cigarettes. If you need help quitting, ask your health care provider.Monitor any changes in your symptoms. Tell your health care provider about any changes.If instructed, monitor your blood pressure at home. Report changes as told by your health care provider.Keep all follow-up visits as told by your health care provider. This is important.Contact a health care provider if:  You have uncomfortable bladder contractions that you cannot control (spasms) or you leak urine with the spasms.Get help right away if:  You have chills or fever.You have blood in your urine.You have a catheter and:  Your catheter stops draining urine.Your catheter falls out.Summary  Acute urinary retention is a condition in which a person is unable to pass urine. If left untreated, it can result in kidney damage or other serious complications.The cause of this condition may include an enlarged prostate. As men age, their prostate gland may become larger and may start pressing or squeezing on the bladder or the urethra.Treatment for this condition may include medicines and placement of an indwelling urinary catheter.Monitor any changes in your symptoms. Tell your health care provider about any changes.This information is not intended to replace advice given to you by your health care provider. Make sure you discuss any questions you have with your health care provider.    Document Released: 03/26/2002 Document Revised: 11/30/2018 Document Reviewed: 01/19/2018      Hematuria, Adult  Hematuria is blood in the urine. Blood may be visible in the urine, or it may be identified with a test. This condition can be caused by infections of the bladder, urethra, kidney, or prostate. Other possible causes include:  Kidney stones.Cancer of the urinary tract.Too much calcium in the urine.Conditions that are passed from parent to child (inherited conditions). Exercise that requires a lot of energy.Infections can usually be treated with medicine, and a kidney stone usually will pass through your urine. If neither of these is the cause of your hematuria, more tests may be needed to identify the cause of your symptoms.  It is very important to tell your health care provider about any blood in your urine, even if it is painless or the blood stops without treatment. Blood in the urine, when it happens and then stops and then happens again, can be a symptom of a very serious condition, including cancer. There is no pain in the initial stages of many urinary cancers.  Follow these instructions at home:  Medicines     Take over-the-counter and prescription medicines only as told by your health care provider.If you were prescribed an antibiotic medicine, take it as told by your health care provider. Do not stop taking the antibiotic even if you start to feel better.Eating and drinking     Drink enough fluid to keep your urine clear or pale yellow. It is recommended that you drink 3–4 quarts (2.8–3.8 L) a day. If you have been diagnosed with an infection, it is recommended that you drink cranberry juice in addition to large amounts of water.Avoid caffeine, tea, and carbonated beverages. These tend to irritate the bladder.Avoid alcohol because it may irritate the prostate (men).General instructions     If you have been diagnosed with a kidney stone, follow your health care provider's instructions about straining your urine to catch the stone.Empty your bladder often. Avoid holding urine for long periods of time.If you are female:  After a bowel movement, wipe from front to back and use each piece of toilet paper only once.Empty your bladder before and after sex.Pay attention to any changes in your symptoms. Tell your health care provider about any changes or any new symptoms.It is your responsibility to get your test results. Ask your health care provider, or the department performing the test, when your results will be ready.Keep all follow-up visits as told by your health care provider. This is important.Contact a health care provider if:  You develop back pain.You have a fever.You have nausea or vomiting.Your symptoms do not improve after 3 days.Your symptoms get worse.Get help right away if:  You develop severe vomiting and are unable take medicine without vomiting.You develop severe pain in your back or abdomen even though you are taking medicine.You pass a large amount of blood in your urine.You pass blood clots in your urine.You feel very weak or like you might faint.You faint.Summary  Hematuria is blood in the urine. It has many possible causes.It is very important that you tell your health care provider about any blood in your urine, even if it is painless or the blood stops without treatment.Take over-the-counter and prescription medicines only as told by your health care provider.Drink enough fluid to keep your urine clear or pale yellow.This information is not intended to replace advice given to you by your health care provider. Make sure you discuss any questions you have with your health care provider.    Document Released: 12/18/2006 Document Revised: 05/13/2020 Document Reviewed: 01/20/2018

## 2020-09-10 NOTE — H&P ADULT - ASSESSMENT
80 year old with history of BPH presenting in urinary retention secondary to hematuria. Now on CBI admitted to urology

## 2020-09-10 NOTE — ED ADULT NURSE NOTE - OBJECTIVE STATEMENT
Pt presents with c/o "urinary retention" since approx 1900. States felt urge to void along with suprapubic pressure, unable to urinate, s/s worsened. Reports hx of "enlarged prostate". Followed up bu urologist. Last episode of similar s/s x approx 4 years ago per pt.

## 2020-09-10 NOTE — ED ADULT NURSE NOTE - OBJECTIVE STATEMENT
Patient reports he went to urologist as instructed to do after he was discharged this AM from the ED. Urology removed the catheter, irrigated and instructed patient to return to ED to see urology Dr. Rose if retention persisted.   As per patient, "leaking blood" from urethra but unable to void independently. Endorses pain is increasing in supra-pubic area as it was prior to catheter insertion earlier this AM.   On daily ASA 81, A+ O x 4. Sinus tach < 120 bpm 2* to pain/discomfort  (+)bladder distention on exam

## 2020-09-10 NOTE — ED PROVIDER NOTE - CLINICAL SUMMARY MEDICAL DECISION MAKING FREE TEXT BOX
pt returns for urinary retention - had so removed by urologist and new so unable to be placed, c/o lower abd pressure, gu consulted pt returns for urinary retention - had so removed by urologist and new so unable to be placed, c/o lower abd pressure, gu consulted - so placed, hematuria, plan is labs, dose of iv abx and admit for cbi/observation

## 2020-09-10 NOTE — H&P ADULT - NSICDXPASTMEDICALHX_GEN_ALL_CORE_FT
PAST MEDICAL HISTORY:  BPH (benign prostatic hyperplasia)     High cholesterol     HTN (hypertension)     Urinary retention

## 2020-09-10 NOTE — ED ADULT NURSE REASSESSMENT NOTE - REASSESS COMMUNICATION
inpatient nurse report called/bedside report to RAMIN Chew inpatient nurse report called/bedside report to Vida Grimm RN

## 2020-09-10 NOTE — ED PROVIDER NOTE - CONSTITUTIONAL, MLM
normal... AAOx3, older patient, appears well and stated age, standing at bedside with minimal discomfort, NAD AAOx3, Appears well, standing at bedside and appears to be uncomfortable with NAD

## 2020-09-10 NOTE — ED PROVIDER NOTE - ENMT NEGATIVE STATEMENT, MLM
Ears: +wears hearing aids, no ear pain. Nose: no nasal congestion and no nasal drainage.Mouth/Throat: no dysphagia, no hoarseness and no throat pain.Neck: no lumps, no pain, no stiffness and no swollen glands.

## 2020-09-10 NOTE — ED PROVIDER NOTE - GASTROINTESTINAL, MLM
Lower quadrant distension, ttp, no CVA tenderness, +BS all four quadrants, no rigidity/guarding Supra-pubic distension with some TTP, no CVA tenderness

## 2020-09-10 NOTE — ED PROVIDER NOTE - PATIENT PORTAL LINK FT
You can access the FollowMyHealth Patient Portal offered by [facility] by registering at the following website: [email or web address]. By joining [facility]’s FollowMyHealth portal, you will also be able to view your health information using other applications (apps) compatible with our system.

## 2020-09-10 NOTE — ED PROVIDER NOTE - CARE PROVIDER_API CALL
JASON CLARKE  Urology  [address] [address]  Phone: [phone]  Fax: [phone]  Follow Up Time:

## 2020-09-10 NOTE — ED PROVIDER NOTE - GENITOURINARY, MLM
Suprapubic distension, normal external male genitalia without lesions/discharge/bleeding,
severely reduced LV function

## 2020-09-10 NOTE — ED ADULT NURSE NOTE - PMH
BPH (benign prostatic hyperplasia)    Urinary retention BPH (benign prostatic hyperplasia)    High cholesterol    HTN (hypertension)    Urinary retention

## 2020-09-10 NOTE — ED ADULT TRIAGE NOTE - CHIEF COMPLAINT QUOTE
unable to urinate for 3 hours . last urinary retention was 4 yrs  ago. pt denies hematuria ,no fever c/o lower abdominal pain.

## 2020-09-10 NOTE — ED PROVIDER NOTE - ENMT, MLM
Wearing hearing aids B/L. Airway patent, Nasal mucosa clear. Mouth with normal mucosa. Throat has no vesicles, no oropharyngeal exudates and uvula is midline. Wearing hearing aids B/L. Airway patent

## 2020-09-10 NOTE — ED ADULT NURSE NOTE - NSIMPLEMENTINTERV_GEN_ALL_ED
Implemented All Universal Safety Interventions:  [place] to call system. Call bell, personal items and telephone within reach. Instruct patient to call for assistance. Room bathroom lighting operational. Non-slip footwear when patient is off stretcher. Physically safe environment: no spills, clutter or unnecessary equipment. Stretcher in lowest position, wheels locked, appropriate side rails in place.

## 2020-09-10 NOTE — ED PROVIDER NOTE - PATIENT PORTAL LINK FT
You can access the FollowMyHealth Patient Portal offered by [facility] by registering at the following website: [email or web address]. By joining [employer]’s FollowMyHealth portal, you will also be able to view your health information using other applications (apps) compatible with our system.

## 2020-09-10 NOTE — ED PROVIDER NOTE - PROGRESS NOTE DETAILS
Deshawn Singleton, MS4:  Patient sitting in bed in NAD, s/p x2 attempts to place sterile catheter at bedside with retrieval of clots and blood.  Consulted uro to place catheter and evaluate further.  Will re-assess V/S prior to D/C. Deshawn Singleton, MS4:  Patient sleeping comfortably in bed, s/p urinary catheter placement by urology.  Currently 1.2L bloody urine collected.

## 2020-09-10 NOTE — ED ADULT NURSE NOTE - OBJECTIVE STATEMENT
79 y/o male w/ hx of BPH returns to ED s/p dc at 715 AM today c/o urine leaking around so catheter. Pt initially presented to ED c/o urinary retention since 1900 yesterday. Pt reports urge to void w/ mild suprapubic pressure. Pt denies fever, chills, NVD, hematuria, flank pain, and any other associated sx.

## 2020-09-10 NOTE — ED ADULT NURSE NOTE - CHIEF COMPLAINT QUOTE
pt arriving to ED for c/c urinary retention. pt states he was seen at [facility] yesterday for urinary retention, here for worsening pain

## 2020-09-10 NOTE — ED ADULT NURSE REASSESSMENT NOTE - NS ED NURSE REASSESS COMMENT FT1
Pt discharge and exit from ER delayed secondary to pt's request to "rest and stay out of the rain". Pt allowed to remain in ER Rm 2o; leg bag placed at this time, given written and verbal dc instructions. Pt ambulatory  with no s/s distress noted at time of departure.

## 2020-09-11 ENCOUNTER — TRANSCRIPTION ENCOUNTER (OUTPATIENT)
Age: 80
End: 2020-09-11

## 2020-09-11 VITALS
RESPIRATION RATE: 18 BRPM | OXYGEN SATURATION: 93 % | DIASTOLIC BLOOD PRESSURE: 75 MMHG | SYSTOLIC BLOOD PRESSURE: 127 MMHG | TEMPERATURE: 99 F | HEART RATE: 68 BPM

## 2020-09-11 LAB
ANION GAP SERPL CALC-SCNC: 10 MMOL/L — SIGNIFICANT CHANGE UP (ref 5–17)
BASOPHILS # BLD AUTO: 0.03 K/UL — SIGNIFICANT CHANGE UP (ref 0–0.2)
BASOPHILS NFR BLD AUTO: 0.3 % — SIGNIFICANT CHANGE UP (ref 0–2)
BUN SERPL-MCNC: 18 MG/DL — SIGNIFICANT CHANGE UP (ref 7–23)
CALCIUM SERPL-MCNC: 9.1 MG/DL — SIGNIFICANT CHANGE UP (ref 8.4–10.5)
CHLORIDE SERPL-SCNC: 105 MMOL/L — SIGNIFICANT CHANGE UP (ref 96–108)
CO2 SERPL-SCNC: 25 MMOL/L — SIGNIFICANT CHANGE UP (ref 22–31)
CREAT SERPL-MCNC: 1.03 MG/DL — SIGNIFICANT CHANGE UP (ref 0.5–1.3)
CULTURE RESULTS: NO GROWTH — SIGNIFICANT CHANGE UP
CULTURE RESULTS: NO GROWTH — SIGNIFICANT CHANGE UP
EOSINOPHIL # BLD AUTO: 0.1 K/UL — SIGNIFICANT CHANGE UP (ref 0–0.5)
EOSINOPHIL NFR BLD AUTO: 1.1 % — SIGNIFICANT CHANGE UP (ref 0–6)
GLUCOSE SERPL-MCNC: 157 MG/DL — HIGH (ref 70–99)
HCT VFR BLD CALC: 34.8 % — LOW (ref 39–50)
HGB BLD-MCNC: 11.4 G/DL — LOW (ref 13–17)
IMM GRANULOCYTES NFR BLD AUTO: 0.4 % — SIGNIFICANT CHANGE UP (ref 0–1.5)
LYMPHOCYTES # BLD AUTO: 0.93 K/UL — LOW (ref 1–3.3)
LYMPHOCYTES # BLD AUTO: 10.3 % — LOW (ref 13–44)
MAGNESIUM SERPL-MCNC: 1.8 MG/DL — SIGNIFICANT CHANGE UP (ref 1.6–2.6)
MCHC RBC-ENTMCNC: 32.1 PG — SIGNIFICANT CHANGE UP (ref 27–34)
MCHC RBC-ENTMCNC: 32.8 GM/DL — SIGNIFICANT CHANGE UP (ref 32–36)
MCV RBC AUTO: 98 FL — SIGNIFICANT CHANGE UP (ref 80–100)
MONOCYTES # BLD AUTO: 0.79 K/UL — SIGNIFICANT CHANGE UP (ref 0–0.9)
MONOCYTES NFR BLD AUTO: 8.7 % — SIGNIFICANT CHANGE UP (ref 2–14)
NEUTROPHILS # BLD AUTO: 7.15 K/UL — SIGNIFICANT CHANGE UP (ref 1.8–7.4)
NEUTROPHILS NFR BLD AUTO: 79.2 % — HIGH (ref 43–77)
NRBC # BLD: 0 /100 WBCS — SIGNIFICANT CHANGE UP (ref 0–0)
PHOSPHATE SERPL-MCNC: 2.8 MG/DL — SIGNIFICANT CHANGE UP (ref 2.5–4.5)
PLATELET # BLD AUTO: 136 K/UL — LOW (ref 150–400)
POTASSIUM SERPL-MCNC: 3.5 MMOL/L — SIGNIFICANT CHANGE UP (ref 3.5–5.3)
POTASSIUM SERPL-SCNC: 3.5 MMOL/L — SIGNIFICANT CHANGE UP (ref 3.5–5.3)
RBC # BLD: 3.55 M/UL — LOW (ref 4.2–5.8)
RBC # FLD: 13.7 % — SIGNIFICANT CHANGE UP (ref 10.3–14.5)
SODIUM SERPL-SCNC: 140 MMOL/L — SIGNIFICANT CHANGE UP (ref 135–145)
SPECIMEN SOURCE: SIGNIFICANT CHANGE UP
SPECIMEN SOURCE: SIGNIFICANT CHANGE UP
WBC # BLD: 9.04 K/UL — SIGNIFICANT CHANGE UP (ref 3.8–10.5)
WBC # FLD AUTO: 9.04 K/UL — SIGNIFICANT CHANGE UP (ref 3.8–10.5)

## 2020-09-11 PROCEDURE — 81001 URINALYSIS AUTO W/SCOPE: CPT

## 2020-09-11 PROCEDURE — 87635 SARS-COV-2 COVID-19 AMP PRB: CPT

## 2020-09-11 PROCEDURE — 85610 PROTHROMBIN TIME: CPT

## 2020-09-11 PROCEDURE — 85025 COMPLETE CBC W/AUTO DIFF WBC: CPT

## 2020-09-11 PROCEDURE — 80053 COMPREHEN METABOLIC PANEL: CPT

## 2020-09-11 PROCEDURE — 80048 BASIC METABOLIC PNL TOTAL CA: CPT

## 2020-09-11 PROCEDURE — 36415 COLL VENOUS BLD VENIPUNCTURE: CPT

## 2020-09-11 PROCEDURE — 76770 US EXAM ABDO BACK WALL COMP: CPT

## 2020-09-11 PROCEDURE — 85730 THROMBOPLASTIN TIME PARTIAL: CPT

## 2020-09-11 PROCEDURE — 83735 ASSAY OF MAGNESIUM: CPT

## 2020-09-11 PROCEDURE — 84100 ASSAY OF PHOSPHORUS: CPT

## 2020-09-11 PROCEDURE — 99285 EMERGENCY DEPT VISIT HI MDM: CPT | Mod: 25

## 2020-09-11 PROCEDURE — 76770 US EXAM ABDO BACK WALL COMP: CPT | Mod: 26

## 2020-09-11 PROCEDURE — 51702 INSERT TEMP BLADDER CATH: CPT

## 2020-09-11 PROCEDURE — 87086 URINE CULTURE/COLONY COUNT: CPT

## 2020-09-11 RX ORDER — ASPIRIN/CALCIUM CARB/MAGNESIUM 324 MG
0 TABLET ORAL
Qty: 0 | Refills: 0 | DISCHARGE

## 2020-09-11 RX ORDER — FINASTERIDE 5 MG/1
5 TABLET, FILM COATED ORAL DAILY
Refills: 0 | Status: DISCONTINUED | OUTPATIENT
Start: 2020-09-11 | End: 2020-09-11

## 2020-09-11 RX ORDER — TADALAFIL 10 MG/1
1 TABLET, FILM COATED ORAL
Qty: 0 | Refills: 0 | DISCHARGE

## 2020-09-11 RX ORDER — TADALAFIL 10 MG/1
1 TABLET, FILM COATED ORAL
Qty: 14 | Refills: 0
Start: 2020-09-11

## 2020-09-11 RX ORDER — ATORVASTATIN CALCIUM 80 MG/1
1 TABLET, FILM COATED ORAL
Qty: 0 | Refills: 0 | DISCHARGE

## 2020-09-11 RX ORDER — COLCHICINE 0.6 MG
1 TABLET ORAL
Qty: 0 | Refills: 0 | DISCHARGE

## 2020-09-11 RX ORDER — UBIDECARENONE 100 MG
1 CAPSULE ORAL
Qty: 0 | Refills: 0 | DISCHARGE

## 2020-09-11 RX ORDER — ROSUVASTATIN CALCIUM 5 MG/1
0.5 TABLET ORAL
Qty: 0 | Refills: 0 | DISCHARGE

## 2020-09-11 RX ORDER — LOSARTAN POTASSIUM 100 MG/1
1 TABLET, FILM COATED ORAL
Qty: 0 | Refills: 0 | DISCHARGE
Start: 2020-09-11

## 2020-09-11 RX ORDER — EZETIMIBE 10 MG/1
1 TABLET ORAL
Qty: 0 | Refills: 0 | DISCHARGE

## 2020-09-11 RX ORDER — MULTIVIT-MIN/FERROUS GLUCONATE 9 MG/15 ML
1 LIQUID (ML) ORAL
Qty: 0 | Refills: 0 | DISCHARGE

## 2020-09-11 RX ORDER — ALLOPURINOL 300 MG
1 TABLET ORAL
Qty: 0 | Refills: 0 | DISCHARGE
Start: 2020-09-11

## 2020-09-11 RX ORDER — FUROSEMIDE 40 MG
10 TABLET ORAL
Qty: 0 | Refills: 0 | DISCHARGE

## 2020-09-11 RX ORDER — ROSUVASTATIN CALCIUM 5 MG/1
1 TABLET ORAL
Qty: 0 | Refills: 0 | DISCHARGE

## 2020-09-11 RX ORDER — ASPIRIN/CALCIUM CARB/MAGNESIUM 324 MG
81 TABLET ORAL
Qty: 0 | Refills: 0 | DISCHARGE

## 2020-09-11 RX ORDER — ALLOPURINOL 300 MG
1 TABLET ORAL
Qty: 0 | Refills: 0 | DISCHARGE

## 2020-09-11 RX ORDER — FINASTERIDE 5 MG/1
1 TABLET, FILM COATED ORAL
Qty: 14 | Refills: 0
Start: 2020-09-11

## 2020-09-11 RX ORDER — LOSARTAN POTASSIUM 100 MG/1
1 TABLET, FILM COATED ORAL
Qty: 0 | Refills: 0 | DISCHARGE

## 2020-09-11 RX ORDER — FINASTERIDE 5 MG/1
1 TABLET, FILM COATED ORAL
Qty: 0 | Refills: 0 | DISCHARGE
Start: 2020-09-11

## 2020-09-11 RX ADMIN — CEFTRIAXONE 100 MILLIGRAM(S): 500 INJECTION, POWDER, FOR SOLUTION INTRAMUSCULAR; INTRAVENOUS at 16:27

## 2020-09-11 RX ADMIN — Medication 300 MILLIGRAM(S): at 11:39

## 2020-09-11 RX ADMIN — LOSARTAN POTASSIUM 25 MILLIGRAM(S): 100 TABLET, FILM COATED ORAL at 05:56

## 2020-09-11 RX ADMIN — FINASTERIDE 5 MILLIGRAM(S): 5 TABLET, FILM COATED ORAL at 11:38

## 2020-09-11 NOTE — DISCHARGE NOTE PROVIDER - CARE PROVIDER_API CALL
Farzad Rose  UROLOGY  [address] [address]  Phone: [phone]  Fax: [phone]  Follow Up Time:

## 2020-09-11 NOTE — PROGRESS NOTE ADULT - SUBJECTIVE AND OBJECTIVE BOX
INTERVAL HPI/OVERNIGHT EVENTS:  No acute events overnight.    VITALS:    T(F): 98.8 (20 @ 00:15), Max: 98.8 (20 @ 00:15)  HR: 71 (20 @ 00:15) (66 - 120)  BP: 115/68 (20 @ 00:15) (115/68 - 170/100)  RR: 18 (20 @ 00:15) (15 - 18)  SpO2: 94% (20 @ 00:15) (94% - 99%)  Wt(kg): --    I&O's Detail    10 Sep 2020 07:01  -  11 Sep 2020 05:34  --------------------------------------------------------  IN:    Continuous Bladder Irrigation: 9600 mL  Total IN: 9600 mL    OUT:    Continuous Bladder Irrigation: 14764 mL  Total OUT: 34926 mL    Total NET: -1900 mL          MEDICATIONS:    ANTIBIOTICS:  cefTRIAXone   IVPB 1000 milliGRAM(s) IV Intermittent every 24 hours      PAIN CONTROL:  acetaminophen   Tablet .. 650 milliGRAM(s) Oral every 6 hours PRN  diazepam    Tablet 5 milliGRAM(s) Oral every 8 hours PRN       MEDS:  oxybutynin 5 milliGRAM(s) Oral every 8 hours PRN      HEME/ONC        PHYSICAL EXAM:  General: No acute distress.  Alert and Oriented  Abdominal Exam:   Exam:      LABS:                        13.5   10.23 )-----------( 150      ( 10 Sep 2020 15:30 )             39.9     10    142  |  105  |  31<H>  ----------------------------<  143<H>  3.7   |  23  |  1.40<H>    Ca    10.4      10 Sep 2020 15:30    TPro  6.5  /  Alb  4.0  /  TBili  1.0  /  DBili  x   /  AST  45<H>  /  ALT  32  /  AlkPhos  77  09-10    PT/INR - ( 10 Sep 2020 15:30 )   PT: 13.1 sec;   INR: 1.10          PTT - ( 10 Sep 2020 15:30 )  PTT:31.0 sec  Urinalysis Basic - ( 10 Sep 2020 15:40 )    Color: Red / Appearance: Turbid / S.015 / pH: x  Gluc: x / Ketone: Trace mg/dL  / Bili: Small / Urobili: See note E.U./dL   Blood: x / Protein: >=300 mg/dL / Nitrite: See note   Leuk Esterase: Moderate / RBC: Many /HPF / WBC < 5 /HPF   Sq Epi: x / Non Sq Epi: x / Bacteria: Rare /HPF        RADIOLOGY & ADDITIONAL TESTS:    ASSESSMENT: 80yMale s/p     PLAN:  Diet: clears  Pain control  Monitor Urine Output  DVT ppx  Cont Abx  OOB/IS INTERVAL HPI/OVERNIGHT EVENTS:  No acute events overnight.    VITALS:    T(F): 98.8 (20 @ 00:15), Max: 98.8 (20 @ 00:15)  HR: 71 (20 @ 00:15) (66 - 120)  BP: 115/68 (20 @ 00:15) (115/68 - 170/100)  RR: 18 (20 @ 00:15) (15 - 18)  SpO2: 94% (20 @ 00:15) (94% - 99%)  Wt(kg): --    I&O's Detail    10 Sep 2020 07:01  -  11 Sep 2020 05:34  --------------------------------------------------------  IN:    Continuous Bladder Irrigation: 9600 mL  Total IN: 9600 mL    OUT:    Continuous Bladder Irrigation: 65728 mL  Total OUT: 47246 mL    Total NET: -1900 mL          MEDICATIONS:    ANTIBIOTICS:  cefTRIAXone   IVPB 1000 milliGRAM(s) IV Intermittent every 24 hours      PAIN CONTROL:  acetaminophen   Tablet .. 650 milliGRAM(s) Oral every 6 hours PRN  diazepam    Tablet 5 milliGRAM(s) Oral every 8 hours PRN       MEDS:  oxybutynin 5 milliGRAM(s) Oral every 8 hours PRN      HEME/ONC        PHYSICAL EXAM:  General: No acute distress.  Alert and Oriented  Abdominal Exam: soft nt/nd.    Exam: Prado cath in place w/ CBI on draining clear.       LABS:                        13.5   10.23 )-----------( 150      ( 10 Sep 2020 15:30 )             39.9     09-10    142  |  105  |  31<H>  ----------------------------<  143<H>  3.7   |  23  |  1.40<H>    Ca    10.4      10 Sep 2020 15:30    TPro  6.5  /  Alb  4.0  /  TBili  1.0  /  DBili  x   /  AST  45<H>  /  ALT  32  /  AlkPhos  77  09-10    PT/INR - ( 10 Sep 2020 15:30 )   PT: 13.1 sec;   INR: 1.10          PTT - ( 10 Sep 2020 15:30 )  PTT:31.0 sec  Urinalysis Basic - ( 10 Sep 2020 15:40 )    Color: Red / Appearance: Turbid / S.015 / pH: x  Gluc: x / Ketone: Trace mg/dL  / Bili: Small / Urobili: See note E.U./dL   Blood: x / Protein: >=300 mg/dL / Nitrite: See note   Leuk Esterase: Moderate / RBC: Many /HPF / WBC < 5 /HPF   Sq Epi: x / Non Sq Epi: x / Bacteria: Rare /HPF        RADIOLOGY & ADDITIONAL TESTS:    ASSESSMENT: 80yMale w/ hematuria on CBI draining clear.  Patient is VSS, HDS, and afebrile.    PLAN:  Diet: Reg  Pain control  Monitor Urine Output  DVT ppx  Cont Abx: Ceftriaxone  OOB/IS

## 2020-09-11 NOTE — DISCHARGE NOTE PROVIDER - NSDCFUADDINST_GEN_ALL_CORE_FT
Prado Catheter Instructions:    - Please care for and empty urinary catheter bag as instructed by nurse.      General Discharge Instructions:    Please resume all regular home medications unless specifically advised not to take a particular medication. Also, please take any new medications as prescribed.    Please get plenty of rest, continue to ambulate several times per day, and drink adequate amounts of fluids.       Warning Signs:    Please call your doctor if you experience the following:    *You experience new chest pain, pressure, squeezing or tightness.    *New or worsening cough, shortness of breath, or wheeze.    *If you are vomiting and cannot keep down fluids or your medications.    *You are getting dehydrated due to continued vomiting, diarrhea, or other reasons. Signs of dehydration include dry mouth, rapid heartbeat, or feeling dizzy or faint when standing.    *You see blood or dark/black material when you vomit or have a bowel movement.    *You experience burning when you urinate, have blood in your urine, or experience a discharge.    *Your pain is not improving within 8-12 hours or is not gone within 24 hours. Call or return immediately if your pain is getting worse, changes location, or moves to your chest or back.    *You have shaking chills, or fever greater than 100.4 degrees Fahrenheit.    *Any change in your symptoms, or any new symptoms that concern you.

## 2020-09-11 NOTE — DISCHARGE NOTE NURSING/CASE MANAGEMENT/SOCIAL WORK - NSDCPNINST_GEN_ALL_CORE
Continue care of your so catheter as you were instructed and as you had demonstrated. Call Dr. Rose if you have any questions or concerns. Due to pandemic corona virus- please continue proper handwashing/ hand hygiene, wearing mask, social distancing. Educational material given- [employer] Patient Education: How To Care for Your So Catheter, Male.

## 2020-09-11 NOTE — DISCHARGE NOTE PROVIDER - NSDCMRMEDTOKEN_GEN_ALL_CORE_FT
Cialis 5 mg oral tablet: 1 tab(s) orally once a day  finasteride 5 mg oral tablet: 1 tab(s) orally once a day allopurinol 300 mg oral tablet: 1 tab(s) orally once a day  aspirin 81 mg oral tablet: orally once a day  atorvastatin 20 mg oral tablet: 1 tab(s) orally once a day  Centrum oral tablet: 1 tab(s) orally once a day  Cialis 5 mg oral tablet: 1 tab(s) orally once a day  CoQ10 300 mg oral capsule: 1 cap(s) orally once a day  Crestor 5 mg oral tablet: 1 tab(s) orally once a day  finasteride 5 mg oral tablet: 1 tab(s) orally once a day  losartan 25 mg oral tablet: 1 tab(s) orally once a day  Mitigare 0.6 mg oral capsule: 1 cap(s) orally once a day  Zetia 10 mg oral tablet: 1 tab(s) orally once a day

## 2020-09-11 NOTE — PROGRESS NOTE ADULT - ATTENDING COMMENTS
Patient seen and examined this AM.  Urine is clear on light CBI.  CBI is currently off, will monitor degree of hematuria off CBI.  If relative ly light will d/c home with Prado to leg bag.  If still too hematuric will restart CBI.  Will start finasteride 5 mg daily and will start alfuzosin and stop daily Cialis.

## 2020-09-11 NOTE — DISCHARGE NOTE NURSING/CASE MANAGEMENT/SOCIAL WORK - PATIENT PORTAL LINK FT
You can access the FollowMyHealth Patient Portal offered by [facility] by registering at the following website: [email or web address]. By joining [employer]’s FollowMyHealth portal, you will also be able to view your health information using other applications (apps) compatible with our system.

## 2020-09-11 NOTE — DISCHARGE NOTE PROVIDER - NSDCCPCAREPLAN_GEN_ALL_CORE_FT
PRINCIPAL DISCHARGE DIAGNOSIS  Diagnosis: Urinary retention  Assessment and Plan of Treatment: so catheter

## 2020-09-14 DIAGNOSIS — R33.9 RETENTION OF URINE, UNSPECIFIED: ICD-10-CM

## 2020-09-14 DIAGNOSIS — R31.9 HEMATURIA, UNSPECIFIED: ICD-10-CM

## 2020-09-15 ENCOUNTER — EMERGENCY (EMERGENCY)
Facility: HOSPITAL | Age: 80
LOS: 1 days | Discharge: HOME | End: 2020-09-15
Attending: EMERGENCY MEDICINE | Admitting: EMERGENCY MEDICINE
Payer: COMMERCIAL

## 2020-09-15 VITALS
HEIGHT: 71 IN | OXYGEN SATURATION: 99 % | TEMPERATURE: 98 F | RESPIRATION RATE: 18 BRPM | WEIGHT: 180.78 LBS | SYSTOLIC BLOOD PRESSURE: 148 MMHG | HEART RATE: 119 BPM | DIASTOLIC BLOOD PRESSURE: 91 MMHG

## 2020-09-15 VITALS — HEART RATE: 86 BPM

## 2020-09-15 DIAGNOSIS — I10 ESSENTIAL (PRIMARY) HYPERTENSION: ICD-10-CM

## 2020-09-15 DIAGNOSIS — E78.5 HYPERLIPIDEMIA, UNSPECIFIED: ICD-10-CM

## 2020-09-15 DIAGNOSIS — R33.9 RETENTION OF URINE, UNSPECIFIED: ICD-10-CM

## 2020-09-15 DIAGNOSIS — Z79.899 OTHER LONG TERM (CURRENT) DRUG THERAPY: ICD-10-CM

## 2020-09-15 DIAGNOSIS — R10.9 UNSPECIFIED ABDOMINAL PAIN: ICD-10-CM

## 2020-09-15 LAB
ALBUMIN SERPL ELPH-MCNC: 3.7 G/DL — SIGNIFICANT CHANGE UP (ref 3.3–5)
ALP SERPL-CCNC: 71 U/L — SIGNIFICANT CHANGE UP (ref 40–120)
ALT FLD-CCNC: 29 U/L — SIGNIFICANT CHANGE UP (ref 10–45)
ANION GAP SERPL CALC-SCNC: 14 MMOL/L — SIGNIFICANT CHANGE UP (ref 5–17)
APPEARANCE UR: CLEAR — SIGNIFICANT CHANGE UP
AST SERPL-CCNC: 31 U/L — SIGNIFICANT CHANGE UP (ref 10–40)
BASOPHILS # BLD AUTO: 0.01 K/UL — SIGNIFICANT CHANGE UP (ref 0–0.2)
BASOPHILS NFR BLD AUTO: 0.1 % — SIGNIFICANT CHANGE UP (ref 0–2)
BILIRUB SERPL-MCNC: 0.6 MG/DL — SIGNIFICANT CHANGE UP (ref 0.2–1.2)
BILIRUB UR-MCNC: NEGATIVE — SIGNIFICANT CHANGE UP
BUN SERPL-MCNC: 35 MG/DL — HIGH (ref 7–23)
CALCIUM SERPL-MCNC: 9.8 MG/DL — SIGNIFICANT CHANGE UP (ref 8.4–10.5)
CHLORIDE SERPL-SCNC: 102 MMOL/L — SIGNIFICANT CHANGE UP (ref 96–108)
CO2 SERPL-SCNC: 22 MMOL/L — SIGNIFICANT CHANGE UP (ref 22–31)
COLOR SPEC: YELLOW — SIGNIFICANT CHANGE UP
CREAT SERPL-MCNC: 1.22 MG/DL — SIGNIFICANT CHANGE UP (ref 0.5–1.3)
DIFF PNL FLD: ABNORMAL
EOSINOPHIL # BLD AUTO: 0.23 K/UL — SIGNIFICANT CHANGE UP (ref 0–0.5)
EOSINOPHIL NFR BLD AUTO: 3.2 % — SIGNIFICANT CHANGE UP (ref 0–6)
GLUCOSE SERPL-MCNC: 119 MG/DL — HIGH (ref 70–99)
GLUCOSE UR QL: NEGATIVE — SIGNIFICANT CHANGE UP
HCT VFR BLD CALC: 36.4 % — LOW (ref 39–50)
HGB BLD-MCNC: 12.2 G/DL — LOW (ref 13–17)
IMM GRANULOCYTES NFR BLD AUTO: 0.6 % — SIGNIFICANT CHANGE UP (ref 0–1.5)
KETONES UR-MCNC: NEGATIVE — SIGNIFICANT CHANGE UP
LEUKOCYTE ESTERASE UR-ACNC: ABNORMAL
LYMPHOCYTES # BLD AUTO: 1.22 K/UL — SIGNIFICANT CHANGE UP (ref 1–3.3)
LYMPHOCYTES # BLD AUTO: 16.9 % — SIGNIFICANT CHANGE UP (ref 13–44)
MCHC RBC-ENTMCNC: 32.8 PG — SIGNIFICANT CHANGE UP (ref 27–34)
MCHC RBC-ENTMCNC: 33.5 GM/DL — SIGNIFICANT CHANGE UP (ref 32–36)
MCV RBC AUTO: 97.8 FL — SIGNIFICANT CHANGE UP (ref 80–100)
MONOCYTES # BLD AUTO: 0.74 K/UL — SIGNIFICANT CHANGE UP (ref 0–0.9)
MONOCYTES NFR BLD AUTO: 10.2 % — SIGNIFICANT CHANGE UP (ref 2–14)
NEUTROPHILS # BLD AUTO: 4.99 K/UL — SIGNIFICANT CHANGE UP (ref 1.8–7.4)
NEUTROPHILS NFR BLD AUTO: 69 % — SIGNIFICANT CHANGE UP (ref 43–77)
NITRITE UR-MCNC: NEGATIVE — SIGNIFICANT CHANGE UP
NRBC # BLD: 0 /100 WBCS — SIGNIFICANT CHANGE UP (ref 0–0)
PH UR: 6 — SIGNIFICANT CHANGE UP (ref 5–8)
PLATELET # BLD AUTO: 207 K/UL — SIGNIFICANT CHANGE UP (ref 150–400)
POTASSIUM SERPL-MCNC: 3.7 MMOL/L — SIGNIFICANT CHANGE UP (ref 3.5–5.3)
POTASSIUM SERPL-SCNC: 3.7 MMOL/L — SIGNIFICANT CHANGE UP (ref 3.5–5.3)
PROT SERPL-MCNC: 6.6 G/DL — SIGNIFICANT CHANGE UP (ref 6–8.3)
PROT UR-MCNC: 100 MG/DL
RBC # BLD: 3.72 M/UL — LOW (ref 4.2–5.8)
RBC # FLD: 13.4 % — SIGNIFICANT CHANGE UP (ref 10.3–14.5)
SODIUM SERPL-SCNC: 138 MMOL/L — SIGNIFICANT CHANGE UP (ref 135–145)
SP GR SPEC: 1.01 — SIGNIFICANT CHANGE UP (ref 1–1.03)
UROBILINOGEN FLD QL: 0.2 E.U./DL — SIGNIFICANT CHANGE UP
WBC # BLD: 7.23 K/UL — SIGNIFICANT CHANGE UP (ref 3.8–10.5)
WBC # FLD AUTO: 7.23 K/UL — SIGNIFICANT CHANGE UP (ref 3.8–10.5)

## 2020-09-15 PROCEDURE — 85025 COMPLETE CBC W/AUTO DIFF WBC: CPT

## 2020-09-15 PROCEDURE — 99284 EMERGENCY DEPT VISIT MOD MDM: CPT

## 2020-09-15 PROCEDURE — 80053 COMPREHEN METABOLIC PANEL: CPT

## 2020-09-15 PROCEDURE — 36415 COLL VENOUS BLD VENIPUNCTURE: CPT

## 2020-09-15 PROCEDURE — 81001 URINALYSIS AUTO W/SCOPE: CPT

## 2020-09-15 PROCEDURE — 87086 URINE CULTURE/COLONY COUNT: CPT

## 2020-09-15 PROCEDURE — 99283 EMERGENCY DEPT VISIT LOW MDM: CPT

## 2020-09-15 RX ORDER — CEPHALEXIN 500 MG
500 CAPSULE ORAL ONCE
Refills: 0 | Status: COMPLETED | OUTPATIENT
Start: 2020-09-15 | End: 2020-09-15

## 2020-09-15 RX ORDER — CEPHALEXIN 500 MG
1 CAPSULE ORAL
Qty: 20 | Refills: 0
Start: 2020-09-15 | End: 2020-09-24

## 2020-09-15 RX ADMIN — Medication 500 MILLIGRAM(S): at 06:23

## 2020-09-15 NOTE — ED PROVIDER NOTE - PMH
BPH (benign prostatic hyperplasia)    High cholesterol    HTN (hypertension)    Urinary retention

## 2020-09-15 NOTE — ED ADULT NURSE NOTE - NSIMPLEMENTINTERV_GEN_ALL_ED
Implemented All Universal Safety Interventions:  [place] to call system. Call bell, personal items and telephone within reach. Instruct patient to call for assistance. Room bathroom lighting operational. Non-slip footwear when patient is off stretcher. Physically safe environment: no spills, clutter or unnecessary equipment. Stretcher in lowest position, wheels locked, appropriate side rails in place.

## 2020-09-15 NOTE — ED PROVIDER NOTE - CLINICAL SUMMARY MEDICAL DECISION MAKING FREE TEXT BOX
Patient with urinary retention able to pass urine after so was flushed. Output was 650-700ml of tea color urine. Pending labs and UA. Patient feel better and urinating. Patient has appt on Thursday with DR. Rose but he will call  urologist today.

## 2020-09-15 NOTE — ED ADULT NURSE REASSESSMENT NOTE - NS ED NURSE REASSESS COMMENT FT1
pt refused to change the urine bag. "my bag has different system, it will not fit. and I like this bag"

## 2020-09-15 NOTE — ED PROVIDER NOTE - ATTENDING CONTRIBUTION TO CARE
80M pmh BPH, urinary retention, recently admitted w/ retention requiring so and irrigation for recurrent clots p/w urinary retention, last passing urine 11p 9/14, lower abd distention and pressure. No f/c, no abd pain, no n/v. Last seen in clinic yesterday morning when so last placed, requiring 2x attempts.

## 2020-09-15 NOTE — ED PROVIDER NOTE - PATIENT PORTAL LINK FT
You can access the FollowMyHealth Patient Portal offered by [facility] by registering at the following website: [email or web address]. By joining [employer]’s FollowMyHealth portal, you will also be able to view your health information using other applications (apps) compatible with our system.

## 2020-09-15 NOTE — ED PROVIDER NOTE - CARE PROVIDER_API CALL
Farzad Rose  UROLOGY  [address] [address]  Phone: [phone]  Fax: [phone]  Follow Up Time:

## 2020-09-15 NOTE — ED PROVIDER NOTE - OBJECTIVE STATEMENT
81 y/o m with h/o HTN, gout,  BPH, HLD and urinary retention. Patient state seeing Dr. Rose yesterday morning and so was d/c from his recent admission 9/10/20. However patient did not void in the office so another so was placed. He state the so was working fine all day and last void noted was 11pm. Patient report of feeling abd pain and noticed the so bag was not full and pain was worsening.. He admit of urine having bit of blood present.   Denies fever, n, v, sob, chest pain, dysuria. Last urinary retention was 4 yrs ago.

## 2020-09-15 NOTE — ED ADULT NURSE NOTE - OBJECTIVE STATEMENT
pt to ED for urinary retention since last night 11pm . pt has 16Fr 2 way so inserted yesterday in urology clinic due to urinary retention. denies cp, sob, n/v/d, dizziness, fever/cough/chills, blood in urine bag noted on arrival, denies taking aspirin. pmh BPH

## 2020-09-16 DIAGNOSIS — R33.9 RETENTION OF URINE, UNSPECIFIED: ICD-10-CM

## 2020-09-16 DIAGNOSIS — N40.1 BENIGN PROSTATIC HYPERPLASIA WITH LOWER URINARY TRACT SYMPTOMS: ICD-10-CM

## 2020-09-16 DIAGNOSIS — E78.00 PURE HYPERCHOLESTEROLEMIA, UNSPECIFIED: ICD-10-CM

## 2020-09-16 DIAGNOSIS — I10 ESSENTIAL (PRIMARY) HYPERTENSION: ICD-10-CM

## 2020-09-16 DIAGNOSIS — R31.9 HEMATURIA, UNSPECIFIED: ICD-10-CM

## 2020-09-18 PROBLEM — N40.0 BENIGN PROSTATIC HYPERPLASIA WITHOUT LOWER URINARY TRACT SYMPTOMS: Chronic | Status: ACTIVE | Noted: 2020-09-10

## 2020-09-18 PROBLEM — I10 ESSENTIAL (PRIMARY) HYPERTENSION: Chronic | Status: ACTIVE | Noted: 2020-09-10

## 2020-09-18 PROBLEM — R33.9 RETENTION OF URINE, UNSPECIFIED: Chronic | Status: ACTIVE | Noted: 2020-09-10

## 2020-09-18 PROBLEM — E78.00 PURE HYPERCHOLESTEROLEMIA, UNSPECIFIED: Chronic | Status: ACTIVE | Noted: 2020-09-10

## 2020-10-09 ENCOUNTER — OUTPATIENT (OUTPATIENT)
Dept: OUTPATIENT SERVICES | Facility: HOSPITAL | Age: 80
LOS: 1 days | End: 2020-10-09

## 2020-10-09 ENCOUNTER — APPOINTMENT (OUTPATIENT)
Dept: OPHTHALMOLOGY | Facility: CLINIC | Age: 80
End: 2020-10-09

## 2020-10-12 DIAGNOSIS — H26.9 UNSPECIFIED CATARACT: ICD-10-CM

## 2021-05-27 NOTE — ED PROVIDER NOTE - CLINICAL SUMMARY MEDICAL DECISION MAKING FREE TEXT BOX
Patient needs to come in for a visit.  He needs to be seen every 3 months.   Impression: seen in ed for acute urinary retention last night, with placement of so catheter complicated by hematuria, here now for leakage of urine around catheter. Afebrile. HDS. Pt spoke w/ his urologist, Dr. Last, who would like pt to proceed to his office now. Pt requesting dc. Strict ED return instructions discussed in detail and patient given the opportunity to ask any questions about their discharge diagnosis and instructions. Patient verbalized understanding.

## 2021-06-16 NOTE — ED PROVIDER NOTE - DR NAME
Subjective:      Patient ID: Francisca Carlton is a 3 y.o. female.    Chief Complaint: possible Strep / palatal petechiae / cough    HPI  She comes in with her parents and brother for evaluation. Francisca has had a cough for two days and complained today that the roof of her mouth hurt. Mom looked in her mouth and saw some spots and looked it up and information said it could be Strep. She has also complained of some chest pain. No rashes. No fever. No stomach ache, vomiting, or diarrhea. Her appetite has been decreased but she is sleeping OK. No eye redness or discharge. She hasn't been sleeping well. Her brother is also sick with a runny nose and cough.    No past medical history on file.    No past surgical history on file.    Family History   Problem Relation Age of Onset     Asthma Mother      No Medical Problems Father      No Medical Problems Maternal Grandmother      No Medical Problems Maternal Grandfather      No Medical Problems Paternal Grandmother      No Medical Problems Paternal Grandfather        Social History   Substance Use Topics     Smoking status: Never Smoker     Smokeless tobacco: Never Used     Alcohol use None       Review of Systems   Constitutional: Positive for appetite change. Negative for activity change and fever.   HENT: Positive for congestion, rhinorrhea and sore throat. Negative for ear pain, trouble swallowing and voice change.         Red spots on palate   Eyes: Negative for discharge and redness.   Respiratory: Positive for cough.    Cardiovascular: Positive for chest pain.   Gastrointestinal: Negative for abdominal pain, diarrhea and vomiting.   Skin: Negative for rash.   Psychiatric/Behavioral: Positive for sleep disturbance.       Objective:     Pulse 121  Temp 98.9  F (37.2  C) (Oral)   Resp 22  Wt 40 lb 3.2 oz (18.2 kg)  SpO2 96%    Physical Exam   Constitutional: She appears well-nourished. She is active. No distress.   HENT:   Right Ear: Tympanic membrane normal.   Left  Ear: Tympanic membrane normal.   Nose: No nasal discharge.   Mouth/Throat: Mucous membranes are moist. No tonsillar exudate. Pharynx is abnormal.   Few palatal petechiae present / no exudate   Eyes: Conjunctivae are normal. Right eye exhibits no discharge. Left eye exhibits no discharge.   Neck: Neck supple.   Cardiovascular: Regular rhythm.    No murmur heard.  Pulmonary/Chest: Effort normal and breath sounds normal.   Abdominal: Soft. Bowel sounds are normal.   Neurological: She is alert.   Skin: Skin is warm. No rash noted.       Assessment:     Procedures  RST - POS  The primary encounter diagnosis was Strep pharyngitis. A diagnosis of Throat pain was also pertinent to this visit.    Plan:     Patient Instructions   Francisca has Strep throat.    PLAN  Fluids  Amoxicillin 5 ml twice a day for 10 days  No  tomorrow  Call if new concerns         Fabiola Armstrong

## 2021-06-16 NOTE — ED PROVIDER NOTE - SECONDARY DIAGNOSIS.
BEHAVIORAL HEALTH DISCHARGE INSTRUCTIONS:  1. If you start to feel like you cannot keep yourself or others safe:  - FOR AN EMERGENCY CALL 911  - Go to the nearest Emergency Department  - Call [facility] Intake Department: [phone] option 1  - Call the Suicide Prevention Lifeline: 222.768.1003  - Call the COPE 24/7 Hotline: 493.334.3222  - Call the Warmline: 117.995.6974 Hours: 7PM-11PM, Not open on Tuesdays    2.  Avoid weapons or other means of harm.   3. Continue to see your outpatient providers for all mental health and medical needs.    OUTPATIENT APPOINTMENTS  Intensive Outpatient Program  Come to the Intensive Outpatient Program at [facility].   On [place] [address] at 5:15PM  to complete admission process for the intensive outpatient program.  The program meets: Mon, Tue, and Thur from 5:30-8:30PM.     IF YOU ARE UNABLE TO KEEP YOUR APPOINTMENT OR NEED TO CANCEL:  • Call at least 24hrs before your scheduled appointment.   • Failure to call may restrict scheduling future appointments.    [place] OUTPATIENT APPOINTMENTS:  [facility]:  [phone]  Hours:  Monday - Friday 8:00am-5:00pm.  When you call:  • Have the name & number of your [place] Primary Care Provider.  • Have your insurance card ready.  • If traveling to an appointment isn’t  a problem, let the  know. They will widen the search for someone to see you.   IF YOU ARE UNABLE TO KEEP YOUR APPOINTMENT OR NEED TO CANCEL AN [place] OUTPATIENT APPOINTMENT:  • Call: [phone] at least 24hrs before your scheduled appointment.   • Failure to call may restrict scheduling future appointments.    [facility]    24 hour Emergency Information  • Mental Health Crisis Line: [phone]  • [facility] [place] Crisis Line: [phone]  • [facility] (PCS)/Admissions: [phone]  • Sexual Assault Treatment Center:  [phone]  • [facility] (Domestic Abuse Shelter): [phone]  • [facility]: [phone]  • Elder Care Emergency after hours: [phone]  • National Human Trafficking Resource Center: [phone]    Information and Referral  ? Community Advocates: [phone]  ? Community Information Line/IMPACT: 211 or 754-511-6681  ? Department on [phone]  ? Hunger Task Force:  [phone]  ? Mental Health [facility]: [phone]  ? [facility] Health and Human Services Department: [phone]  ? National [place] on Mental Illness ([place]IZ[place]ETH): 802.179.2920  ? Parent Helpline: 872.988.5055  ? [employer]: [phone]    Shelter   • [employer]:  211 or 981-682-0493    Transportation  • [facility]: [phone], you can use your T19 insurance for rides to medical appointments as long as you call at least 2 business days in advance to reserve the ride.        Crisis Resource Center  • The Crisis Resource Center (CRC) is a place that adults who are having a mental health crisis can voluntarily get help. If you are 18 years old or older, a [facility] resident for at least 6 months, are having mental health symptoms and are not having thoughts to kill yourself or others, then you may benefit from a short-term stay at the [facility].                                   • Call to check for open beds:  o [place]:  [phone]  [address] [place] [address]  o [place]: [phone]   [address] [place] [address]  o [facility]: [phone]   [address] [facility] [address]    [facility] [place] Nurse Program Referral Information  \"Open Door\" Nurse Visits, free and open to the public (Adults)  [facility] [address], [place],  & , 9am-2pm, #[phone]  [facility] [address], [place], [place],  & 3rd , 10am-11:30am, #[phone]  [facility] [address], [place], [place], Tuesdays 9-11am, #[phone]  [facility] [address], [place], 1st & 3rd Fridays, 9:30-11am, #[phone]        COMMUNITY RESOURCES-[facility]  Health and Human Services   ? [facility]: [phone]   ? [facility]: [phone] or [phone] ([place])  ? [facility]: [phone]  ? [facility]: [phone]  ? [facility]: [phone]  ? [facility]: [phone] or [phone]  ? [facility]: [phone] (Crisis), [phone] (non-emergent) or [phone] (non-emergent)  ? [facility]: [phone] (Crisis), [phone] (Crisis) or [phone] (non-emergent/regular business hours)        Additional Information:            Hematuria, unspecified type

## 2021-10-22 PROBLEM — Z00.00 ENCOUNTER FOR PREVENTIVE HEALTH EXAMINATION: Status: ACTIVE | Noted: 2021-10-22

## 2022-06-14 ENCOUNTER — NON-APPOINTMENT (OUTPATIENT)
Age: 82
End: 2022-06-14

## 2022-06-22 ENCOUNTER — NON-APPOINTMENT (OUTPATIENT)
Age: 82
End: 2022-06-22

## 2022-06-23 ENCOUNTER — NON-APPOINTMENT (OUTPATIENT)
Age: 82
End: 2022-06-23

## 2022-06-23 ENCOUNTER — APPOINTMENT (OUTPATIENT)
Dept: OPHTHALMOLOGY | Facility: CLINIC | Age: 82
End: 2022-06-23
Payer: COMMERCIAL

## 2022-06-23 PROCEDURE — 92134 CPTRZ OPH DX IMG PST SGM RTA: CPT

## 2022-06-23 PROCEDURE — 92002 INTRM OPH EXAM NEW PATIENT: CPT

## 2022-06-23 PROCEDURE — 76513 OPH US DX ANT SGM US UNI/BI: CPT | Mod: RT

## 2022-07-10 NOTE — ED PROVIDER NOTE - DISPOSITION TYPE
[id number], Dr Sumner at bedside discussing results and follow care. Patient verbalizes he understands instructions.   
DISCHARGE

## 2022-08-22 ENCOUNTER — APPOINTMENT (OUTPATIENT)
Dept: OPHTHALMOLOGY | Facility: CLINIC | Age: 82
End: 2022-08-22

## 2022-08-22 ENCOUNTER — NON-APPOINTMENT (OUTPATIENT)
Age: 82
End: 2022-08-22

## 2022-08-22 PROCEDURE — 66761 REVISION OF IRIS: CPT | Mod: RT

## 2022-10-06 ENCOUNTER — APPOINTMENT (OUTPATIENT)
Dept: OPHTHALMOLOGY | Facility: CLINIC | Age: 82
End: 2022-10-06

## 2022-10-08 NOTE — ED ADULT TRIAGE NOTE - MEANS OF ARRIVAL
From: Emma Castro  To: Indy Ruiz MD  Sent: 10/8/2022 12:49 AM CDT  Subject: Exemestane    Hi Dr Olga Amaral,  I am doing okay with the Exemestane. I do have joint pains but I do not have hot flashes or night sweats. The headaches have gone back to the way they were before medication and so far I am not bothered with itching skin. The insomnia remains the same but I have always had problems with that. I think I will remain on this medication for awhile and see how it goes. I  Could you refill the prescription for 60 or 90 days rather than the 30 days?   Thank you ,  Alia
ambulatory

## 2022-11-04 ENCOUNTER — OUTPATIENT (OUTPATIENT)
Dept: OUTPATIENT SERVICES | Facility: HOSPITAL | Age: 82
LOS: 1 days | End: 2022-11-04

## 2022-11-04 ENCOUNTER — APPOINTMENT (OUTPATIENT)
Dept: OPHTHALMOLOGY | Facility: CLINIC | Age: 82
End: 2022-11-04

## 2022-11-08 DIAGNOSIS — H53.40 UNSPECIFIED VISUAL FIELD DEFECTS: ICD-10-CM

## 2023-06-29 ENCOUNTER — APPOINTMENT (OUTPATIENT)
Dept: OPHTHALMOLOGY | Facility: CLINIC | Age: 83
End: 2023-06-29

## 2023-06-29 ENCOUNTER — OUTPATIENT (OUTPATIENT)
Dept: OUTPATIENT SERVICES | Facility: HOSPITAL | Age: 83
LOS: 1 days | End: 2023-06-29

## 2023-07-05 DIAGNOSIS — H26.9 UNSPECIFIED CATARACT: ICD-10-CM

## 2023-08-17 ENCOUNTER — NON-APPOINTMENT (OUTPATIENT)
Age: 83
End: 2023-08-17

## 2023-09-01 NOTE — ED ADULT TRIAGE NOTE - BP NONINVASIVE SYSTOLIC (MM HG)
No care due was identified.  Health [facility] Embedded Care Due Messages. Reference number: [id number].   9/01/2023 10:46:00 AM CDT  
Please see the attached refill request.  
Refill Routing Note   Medication(s) are not appropriate for processing by [facility] for the following reason(s):      Medication outside of protocol    [facility] action(s):  Route Care Due:  None identified            Appointments  past 12m or future 3m with PCP    Date Provider   Last Visit   8/17/2023 Rajiv Garcia MD   Next Visit   Visit date not found Rajiv Garcia MD   ED visits in past 90 days: 0        Note composed:11:10 AM 09/01/2023          
159

## 2023-09-07 ENCOUNTER — NON-APPOINTMENT (OUTPATIENT)
Age: 83
End: 2023-09-07

## 2023-09-07 ENCOUNTER — APPOINTMENT (OUTPATIENT)
Dept: UROLOGY | Facility: CLINIC | Age: 83
End: 2023-09-07
Payer: COMMERCIAL

## 2023-09-07 VITALS
SYSTOLIC BLOOD PRESSURE: 144 MMHG | OXYGEN SATURATION: 96 % | BODY MASS INDEX: 25.9 KG/M2 | HEART RATE: 80 BPM | WEIGHT: 185 LBS | HEIGHT: 71 IN | TEMPERATURE: 98.1 F | DIASTOLIC BLOOD PRESSURE: 84 MMHG

## 2023-09-07 DIAGNOSIS — Z86.39 PERSONAL HISTORY OF OTHER ENDOCRINE, NUTRITIONAL AND METABOLIC DISEASE: ICD-10-CM

## 2023-09-07 DIAGNOSIS — I10 ESSENTIAL (PRIMARY) HYPERTENSION: ICD-10-CM

## 2023-09-07 DIAGNOSIS — Z87.891 PERSONAL HISTORY OF NICOTINE DEPENDENCE: ICD-10-CM

## 2023-09-07 DIAGNOSIS — Z87.39 PERSONAL HISTORY OF OTHER DISEASES OF THE MUSCULOSKELETAL SYSTEM AND CONNECTIVE TISSUE: ICD-10-CM

## 2023-09-07 LAB
BILIRUB UR QL STRIP: NORMAL
CLARITY UR: CLEAR
COLLECTION METHOD: NORMAL
GLUCOSE UR-MCNC: NORMAL
HCG UR QL: 0.2 EU/DL
HGB UR QL STRIP.AUTO: ABNORMAL
KETONES UR-MCNC: NORMAL
LEUKOCYTE ESTERASE UR QL STRIP: NORMAL
NITRITE UR QL STRIP: NORMAL
PH UR STRIP: 5.5
PROT UR STRIP-MCNC: NORMAL
SP GR UR STRIP: 1.01

## 2023-09-07 PROCEDURE — 99204 OFFICE O/P NEW MOD 45 MIN: CPT

## 2023-09-07 PROCEDURE — 51798 US URINE CAPACITY MEASURE: CPT

## 2023-09-07 RX ORDER — ROSUVASTATIN CALCIUM 5 MG/1
5 TABLET, FILM COATED ORAL
Refills: 0 | Status: ACTIVE | COMMUNITY

## 2023-09-07 RX ORDER — COLCHICINE 0.6 MG/1
CAPSULE ORAL
Refills: 0 | Status: ACTIVE | COMMUNITY

## 2023-09-07 RX ORDER — ALLOPURINOL 100 MG/1
100 TABLET ORAL
Refills: 0 | Status: ACTIVE | COMMUNITY

## 2023-09-07 NOTE — ASSESSMENT
[FreeTextEntry1] : 82 yo male with BPH/LUTS controlled on dual medical therapyt.  Lower urinary symptoms were reviewed including the potential etiologies of the patient's symptoms. The anatomy of the urinary tract was reviewed. Bladder, prostate, and urethral sources, as well as non-urologic sources, were discussed. Options for evaluation were discussed including cystoscopy, urodynamics, and pelvis ultrasonography. Management of symptoms were reviewed including no therapy, medical therapy, and surgical therapy. The long term sequelae of no treatment, including no adverse effects, potential for progressive lower urinary tract symptoms or deterioration, urinary retention, bladder dysfunction, and renal dysfunction were reviewed.   Medical therapy for BPH (benign prostatic hyperplasia), or prostate enlargement, was reviewed including the physiology of medication therapy. Alpha blocker medication therapy was reviewed including adverse effects (including dizziness, hypotension, retrograde ejaculation, rhinitis, fatigue), proper usage, and contraindications. The use of 5 alpha reductase inhibitor medication therapy was reviewed including adverse effects (including decreased libido, erectile dysfunction, fatigue, reduction of PSA level), proper usage, and contraindications. Combination medication therapy with alpha blocker and 5 alpha reductase inhibitor therapy was reviewed.   Surgical options for BPH were discussed including Rezum, Urolift, laser therapies, TURP, and simple prostatectomy. Risks of surgery were reviewed.  He is very happy with his urination on his current medical regimen and will continue with this current treatment plan.   Plan: 1. Cont with silodosin and finasteride 2. RTC 6 months or PRN

## 2023-09-07 NOTE — PHYSICAL EXAM
[General Appearance - Well Developed] : well developed [Normal Appearance] : normal appearance [Heart Rate And Rhythm] : Heart rate and rhythm were normal [] : no respiratory distress [Abdomen Soft] : soft [Abdomen Tenderness] : non-tender [Abdomen Hernia] : no hernia was discovered [Costovertebral Angle Tenderness] : no ~M costovertebral angle tenderness [Urethral Meatus] : meatus normal [Penis Abnormality] : normal circumcised penis [Epididymis] : the epididymides were normal [Testes Tenderness] : no tenderness of the testes [Testes Mass (___cm)] : there were no testicular masses [Prostate Tenderness] : the prostate was not tender [No Prostate Nodules] : no prostate nodules [Prostate Size ___ (0-4)] : prostate size [unfilled] (scale: 0-4) [Normal Station and Gait] : the gait and station were normal for the patient's age [Skin Color & Pigmentation] : normal skin color and pigmentation [No Focal Deficits] : no focal deficits [Oriented To Time, Place, And Person] : oriented to person, place, and time

## 2023-09-07 NOTE — HISTORY OF PRESENT ILLNESS
[FreeTextEntry1] : 84 yo male presents for continuity of care after his urologist retired.  He has a hx of BPH with a massive 400 cc prostate.  He has a hx of episodes of AUR, most recently in 2020.  He is managed with dual medical therapy (silodosin and finasteride).  He has been feeling well urologically without any complaints.  He denies dysuria, hematuria or inability to void.    PVR = 69 cc (LUTS) IPSS = 5; QoL = 0

## 2023-11-04 NOTE — ED ADULT TRIAGE NOTE - WEIGHT METHOD
"NEPHROLOGY PROGRESS NOTE------[facility]    [facility]  [phone]  Clark Francis MD      Patient Care Team:  Willa Martinez MD as PCP - General  Willa Martinez MD as PCP - Family Medicine  Amor López MD as Consulting Physician (Cardiology)  Teddy Francis MD as Consulting Physician (Nephrology)      Provider:  Clark Francis MD  Patient Name: Rowan Guzman  :  1939    SUBJECTIVE:    F/U ARF/PATT    Feeling good. Family in room. No SOB, CP, palpitations, cramping. No dysuria.     Medication:  aspirin, 81 mg, Oral, Daily  budesonide, 0.5 mg, Nebulization, BID - RT  cefTRIAXone, 1,000 mg, Intravenous, Q24H  cetirizine, 10 mg, Oral, Daily  enoxaparin, 40 mg, Subcutaneous, Q24H  gabapentin, 300 mg, Oral, Nightly  ipratropium-albuterol, 3 mL, Nebulization, TID - RT  isosorbide mononitrate, 30 mg, Oral, Daily  metoprolol succinate XL, 25 mg, Oral, Daily  montelukast, 10 mg, Oral, Nightly  sodium chloride, 10 mL, Intravenous, Q12H      Pharmacy to Dose enoxaparin (LOVENOX),   sodium chloride, 75 mL/hr, Last Rate: 75 mL/hr (23 0211)        OBJECTIVE    Vital Sign Min/Max for last 24 hours  Temp  Min: 97.3 °F (36.3 °C)  Max: 98.1 °F (36.7 °C)   BP  Min: 124/47  Max: 161/62   Pulse  Min: 79  Max: 106   Resp  Min: 15  Max: 24   SpO2  Min: 93 %  Max: 100 %   No data recorded   Weight  Min: 70.3 kg (155 lb)  Max: 70.3 kg (155 lb)     Flowsheet Rows      Flowsheet Row First Filed Value   Admission Height 162.6 cm (64\") Documented at 2023 1523   Admission Weight 68 kg (150 lb) Documented at 2023 1523            No intake/output data recorded.  I/O last 3 completed shifts:  In: 7925 [P.O.:600; I.V.:]  Out: 350 [Urine:350]    Physical Exam:  General Appearance: alert, appears stated age and cooperative  Head: normocephalic, without obvious abnormality and atraumatic    Eyes: conjunctivae and sclerae " "normal and no icterus  Neck: supple and no JVD  Lungs: clear to auscultation and respirations regular  Heart: regular rhythm & normal rate and normal S1, S2  Chest Wall: no abnormalities observed  Abdomen: normal bowel sounds and soft, nontender  Extremities: moves extremities well, no edema, no cyanosis +DJD  Skin: no bleeding, bruising or rash    Neurologic: Alert, and oriented. No focal deficits    Labs:    WBC WBC   Date Value Ref Range Status   11/04/2023 7.40 3.40 - 10.80 10*3/mm3 Final   11/03/2023 11.40 (H) 3.40 - 10.80 10*3/mm3 Final   11/02/2023 12.47 (H) 3.40 - 10.80 10*3/mm3 Final      HGB Hemoglobin   Date Value Ref Range Status   11/04/2023 11.7 (L) 12.0 - 15.9 g/dL Final   11/03/2023 11.9 (L) 12.0 - 15.9 g/dL Final   11/02/2023 13.1 12.0 - 15.9 g/dL Final      HCT Hematocrit   Date Value Ref Range Status   11/04/2023 35.0 34.0 - 46.6 % Final   11/03/2023 35.3 34.0 - 46.6 % Final   11/02/2023 40.5 34.0 - 46.6 % Final      Platelets No results found for: \"LABPLAT\"   MCV MCV   Date Value Ref Range Status   11/04/2023 92.1 79.0 - 97.0 fL Final   11/03/2023 91.6 79.0 - 97.0 fL Final   11/02/2023 93.5 79.0 - 97.0 fL Final          Sodium Sodium   Date Value Ref Range Status   11/04/2023 142 136 - 145 mmol/L Final   11/03/2023 141 136 - 145 mmol/L Final   11/02/2023 134 (L) 136 - 145 mmol/L Final      Potassium Potassium   Date Value Ref Range Status   11/04/2023 3.8 3.5 - 5.2 mmol/L Final   11/03/2023 3.8 3.5 - 5.2 mmol/L Final     Comment:     Slight hemolysis detected by analyzer. Results may be affected.   11/02/2023 3.8 3.5 - 5.2 mmol/L Final      Chloride Chloride   Date Value Ref Range Status   11/04/2023 110 (H) 98 - 107 mmol/L Final   11/03/2023 109 (H) 98 - 107 mmol/L Final   11/02/2023 99 98 - 107 mmol/L Final      CO2 CO2   Date Value Ref Range Status   11/04/2023 23.0 22.0 - 29.0 mmol/L Final   11/03/2023 22.0 22.0 - 29.0 mmol/L Final   11/02/2023 24.8 22.0 - 29.0 mmol/L Final      BUN BUN " "  Date Value Ref Range Status   11/04/2023 21 8 - 23 mg/dL Final   11/03/2023 24 (H) 8 - 23 mg/dL Final   11/02/2023 26 (H) 8 - 23 mg/dL Final      Creatinine Creatinine   Date Value Ref Range Status   11/04/2023 0.84 0.57 - 1.00 mg/dL Final   11/03/2023 0.91 0.57 - 1.00 mg/dL Final   11/02/2023 1.97 (H) 0.57 - 1.00 mg/dL Final      Calcium Calcium   Date Value Ref Range Status   11/04/2023 8.8 8.6 - 10.5 mg/dL Final   11/03/2023 8.0 (L) 8.6 - 10.5 mg/dL Final   11/02/2023 9.2 8.6 - 10.5 mg/dL Final      PO4 No components found for: \"PO4\"   Albumin Albumin   Date Value Ref Range Status   11/04/2023 2.8 (L) 3.5 - 5.2 g/dL Final   11/03/2023 2.9 (L) 3.5 - 5.2 g/dL Final   11/02/2023 3.8 3.5 - 5.2 g/dL Final      Magnesium Magnesium   Date Value Ref Range Status   11/04/2023 2.0 1.6 - 2.4 mg/dL Final   11/03/2023 1.9 1.6 - 2.4 mg/dL Final   11/02/2023 2.0 1.6 - 2.4 mg/dL Final      Uric Acid No components found for: \"URIC ACID\"     Imaging Results (Last 72 Hours)       Procedure Component Value Units Date/Time    US Renal Bilateral [[id number]] Collected: 11/03/23 0944     Updated: 11/03/23 0947    Narrative:      US RENAL BILATERAL    Date of Exam: 11/3/2023 8:55 AM EDT    Indication: ARF/PATT/CRF/CKD.    Comparison: No comparisons available.    Technique: Grayscale and color Doppler ultrasound evaluation of the kidneys and urinary bladder was performed.      Findings:  The right kidney measures 9.35 in length by 4.63 transversely and the left measures 9.42 cm in length by 5.08 cm transversely. There is no hydronephrosis and there are no solid masses. There is a small left cortical cyst measuring 9 x 9 x 10 mm.      Impression:      Impression:  No obstruction. Small simple cyst in the left kidney noted.    Electronically Signed: Leyda Joseph MD    11/3/2023 9:45 AM EDT    Workstation ID: [id number]    CT Head Without Contrast [[id number]] Collected: 11/02/23 1652     Updated: 11/02/23 1700    Narrative:      CT HEAD WO " CONTRAST, CT CERVICAL SPINE WO CONTRAST    Date of Exam: 11/2/2023 3:28 PM CDT    Indication: Head trauma, minor (Age >= 65y)  Head trauma, moderate-severe.    Comparison: None available.    Technique: Axial CT images were obtained of the head and cervical spine without contrast administration.  Coronal in sagittal reconstructions were performed.  Automated exposure control and iterative reconstruction methods were used.      Findings:  CT HEAD:  There is no evidence of acute infarction.    There there is no acute intracranial hemorrhage. There are no extra-axial collections.    Ventricles and CSF spaces are symmetrically prominent. No mass effect nor hydrocephalus.    Moderate nonspecific white matter hypoattenuation suggestive of microvascular scheming disease without mass effect. 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     Paranasal sinuses and mastoid air cells are adequately aerated.     Osseous structures and orbits appear intact.        CT CERVICAL SPINE:  OSSEOUS STRUCTURES: No fracture nor bony destructive process.    ALIGNMENT: There is grade 1 spondylolisthesis at C4/5.    DISC SPACE: Moderate lower cervical spondylosis.    JOINTS: Moderate mid cervical facet arthropathy.    SOFT TISSUES:  Unremarkable    LUNG APICES: Unremarkable    LEVELS: There is moderate osseous left neuroforaminal stenosis at C4/5.      Impression:      Impression:  1.No acute traumatic injury within the head nor cervical spine identified.  2.Age-related changes as detailed above.      Electronically Signed: Kahs Faria MD    11/2/2023 3:58 PM CDT    Workstation ID: [id number]    CT Cervical Spine Without Contrast [[id number]] Collected: 11/02/23 1652     Updated: 11/02/23 1700    Narrative:      CT HEAD WO CONTRAST, CT CERVICAL SPINE WO CONTRAST    Date of Exam: 11/2/2023 3:28 PM CDT    Indication: Head trauma, minor (Age >= 65y)  Head trauma, moderate-severe.    Comparison: None  available.    Technique: Axial CT images were obtained of the head and cervical spine without contrast administration.  Coronal in sagittal reconstructions were performed.  Automated exposure control and iterative reconstruction methods were used.      Findings:  CT HEAD:  There is no evidence of acute infarction.    There there is no acute intracranial hemorrhage. There are no extra-axial collections.    Ventricles and CSF spaces are symmetrically prominent. No mass effect nor hydrocephalus.    Moderate nonspecific white matter hypoattenuation suggestive of microvascular scheming disease without mass effect. 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     Paranasal sinuses and mastoid air cells are adequately aerated.     Osseous structures and orbits appear intact.        CT CERVICAL SPINE:  OSSEOUS STRUCTURES: No fracture nor bony destructive process.    ALIGNMENT: There is grade 1 spondylolisthesis at C4/5.    DISC SPACE: Moderate lower cervical spondylosis.    JOINTS: Moderate mid cervical facet arthropathy.    SOFT TISSUES:  Unremarkable    LUNG APICES: Unremarkable    LEVELS: There is moderate osseous left neuroforaminal stenosis at C4/5.      Impression:      Impression:  1.No acute traumatic injury within the head nor cervical spine identified.  2.Age-related changes as detailed above.      Electronically Signed: Kash Faria MD    11/2/2023 3:58 PM CDT    Workstation ID: [id number]    XR Chest 2 View [[id number]] Collected: 11/02/23 1654     Updated: 11/02/23 1657    Narrative:      XR CHEST 2 VW    Date of Exam: 11/2/2023 3:29 PM CDT    Indication: fall    Comparison: CT chest 5/27/2021    Findings:  The heart is normal in size. The lungs are hyperinflated. No evidence for pneumothorax or pleural effusion. There is a nodule at the right lung base measuring 7 mm. No acute osseous injury noted.      Impression:      Impression:  7 mm nodule at the right lung base. A follow-up  noncontrast CT thorax can be obtained for evaluation.      Electronically Signed: Karyn Faria MD    11/2/2023 3:55 PM CDT    Workstation ID: [id number]    XR Hip With or Without Pelvis 2 - 3 View Left [[id number]] Collected: 11/02/23 1652     Updated: 11/02/23 1657    Narrative:      XR HIP W OR WO PELVIS 2-3 VIEW LEFT    Date of Exam: 11/2/2023 4:29 PM EDT    Indication: fall    Comparison: None available.    Findings:  No definite acute fracture or dislocation. Please note that evaluation of the left pelvic rim is mildly degraded due to patient positioning.    Extensive degenerative changes of the left hip with deformity of the left femoral head with associated adjacent sclerosis.  Mild degenerative changes of the right hip.    No suspicious soft tissue findings. Mild stool burden in the ascending colon      Impression:      Impression:  No definite acute fracture or dislocation.    Extensive degenerative changes with chronic appearing deformity of the left femoral head. Underlying AVN is not excluded.      Electronically Signed: Johnny Ventura DO    11/2/2023 4:55 PM EDT    Workstation ID: [id number]    XR Knee 3 View Left [[id number]] Collected: 11/02/23 1652     Updated: 11/02/23 1656    Narrative:      XR KNEE 3 VW RIGHT, XR KNEE 3 VW LEFT    Date of Exam: 11/2/2023 3:29 PM CDT    Indication: fall    Comparison: None available.    Findings:  Evaluation of the right knee shows no evidence for acute fracture or acute alignment abnormality. There is advanced lateral compartment joint space narrowing with bone-on-bone changes and osteophyte formation. Patellofemoral joint space narrowing is   noted. There is a suprapatellar joint effusion.    Evaluation of the left knee shows no evidence for acute fracture or acute alignment abnormality. Patellofemoral joint space narrowing is noted. No joint effusion.      Impression:      Impression:  No evidence for acute osseous injury to the right or left knee.    Advanced  right knee lateral compartment degenerative arthrosis and joint effusion.      Electronically Signed: Karyn Faria MD    11/2/2023 3:54 PM CDT    Workstation ID: [id number]    XR Knee 3 View Right [[id number]] Collected: 11/02/23 1652     Updated: 11/02/23 1656    Narrative:      XR KNEE 3 VW RIGHT, XR KNEE 3 VW LEFT    Date of Exam: 11/2/2023 3:29 PM CDT    Indication: fall    Comparison: None available.    Findings:  Evaluation of the right knee shows no evidence for acute fracture or acute alignment abnormality. There is advanced lateral compartment joint space narrowing with bone-on-bone changes and osteophyte formation. Patellofemoral joint space narrowing is   noted. There is a suprapatellar joint effusion.    Evaluation of the left knee shows no evidence for acute fracture or acute alignment abnormality. Patellofemoral joint space narrowing is noted. No joint effusion.      Impression:      Impression:  No evidence for acute osseous injury to the right or left knee.    Advanced right knee lateral compartment degenerative arthrosis and joint effusion.      Electronically Signed: Karyn Faria MD    11/2/2023 3:54 PM CDT    Workstation ID: [id number]    XR Elbow 3+ View Left [[id number]] Collected: 11/02/23 1651     Updated: 11/02/23 1654    Narrative:      XR ELBOW 3+ VW LEFT    Date of Exam: 11/2/2023 4:29 PM EDT    Indication: fall    Comparison: None available.    Findings:  There is no evidence of fracture or dislocation.  No focal lesions identified. No erosions.    Mild degenerative changes.    No focal soft tissue abnormalities identified.      Impression:      Impression:  No acute abnormality.      Electronically Signed: Johnny Ventura DO    11/2/2023 4:52 PM EDT    Workstation ID: [id number]            Results for orders placed during the hospital encounter of 11/02/23    XR Knee 3 View Right    Narrative  XR KNEE 3 VW RIGHT, XR KNEE 3 VW LEFT    Date of Exam: 11/2/2023 3:29 PM CDT    Indication:  fall    Comparison: None available.    Findings:  Evaluation of the right knee shows no evidence for acute fracture or acute alignment abnormality. There is advanced lateral compartment joint space narrowing with bone-on-bone changes and osteophyte formation. Patellofemoral joint space narrowing is  noted. There is a suprapatellar joint effusion.    Evaluation of the left knee shows no evidence for acute fracture or acute alignment abnormality. Patellofemoral joint space narrowing is noted. No joint effusion.    Impression  Impression:  No evidence for acute osseous injury to the right or left knee.    Advanced right knee lateral compartment degenerative arthrosis and joint effusion.      Electronically Signed: Karyn Faria MD  11/2/2023 3:54 PM CDT  Workstation ID: [id number]      XR Knee 3 View Left    Narrative  XR KNEE 3 VW RIGHT, XR KNEE 3 VW LEFT    Date of Exam: 11/2/2023 3:29 PM CDT    Indication: fall    Comparison: None available.    Findings:  Evaluation of the right knee shows no evidence for acute fracture or acute alignment abnormality. There is advanced lateral compartment joint space narrowing with bone-on-bone changes and osteophyte formation. Patellofemoral joint space narrowing is  noted. There is a suprapatellar joint effusion.    Evaluation of the left knee shows no evidence for acute fracture or acute alignment abnormality. Patellofemoral joint space narrowing is noted. No joint effusion.    Impression  Impression:  No evidence for acute osseous injury to the right or left knee.    Advanced right knee lateral compartment degenerative arthrosis and joint effusion.      Electronically Signed: Karyn Faria MD  11/2/2023 3:54 PM CDT  Workstation ID: [id number]      XR Chest 2 View    Narrative  XR CHEST 2 VW    Date of Exam: 11/2/2023 3:29 PM CDT    Indication: fall    Comparison: CT chest 5/27/2021    Findings:  The heart is normal in size. The lungs are hyperinflated. No evidence for  pneumothorax or pleural effusion. There is a nodule at the right lung base measuring 7 mm. No acute osseous injury noted.    Impression  Impression:  7 mm nodule at the right lung base. A follow-up noncontrast CT thorax can be obtained for evaluation.      Electronically Signed: Karyn Faria MD  11/2/2023 3:55 PM CDT  Workstation ID: [id number]            ASSESSMENT / PLAN      PATT (acute kidney injury)    Rhabdomyolysis    ARF/PATT------Nonoliguric. Appears to have ARF/PATT on top of CRF/CKD STG 2 with a baseline  Serum creatinine of about 0.9. PATT resolved. D/C IVFs and follow     2. HYPOCALCEMIA-------Replaced     3. DMII--------BS ok. Glucometers, SSI     4. HTN WITH CKD-----BP ok this AM. Avoid hypotension. No ACE-I/ARB/DRI/diuretic for now     5. CAD/ELEVATED TROPONIN------No angina or gross overload.  D/C IVFs today and follow     6. HYPERLIPIDEMIA----CK and TSH ok     7. OA/DJD------No NSAIDs. Uric ok     8. KETONURIA/DEHYDRATION-----Clinically better. Follow off of IVFs     9. DELIRIUM------Resolved     10. S/P FALL-----Avoid hypotension. Cardiac w/u underway. CK ok     11. DVT PROPHYLAXIS------Lovenox     12. PUD PROPHYLAXIS-----Pepcid    13. UTI------C and S pending. On Evelin Francis MD  [facility]  [phone]  11/04/23  08:42 EDT     stated

## 2024-04-12 NOTE — ED ADULT NURSE NOTE - NSFALLRSKINDICATORS_ED_ALL_ED
Dexamethasone Refill   Last prescribing provider: DR Nolan     Last clinic visit date: 4/10/24 Erica Kellogg     Recommendations for requested medication (if none, N/A): Copied from chart note 4/10/24 Erica Kellogg   dexAMETHasone (DECADRON) 4 MG tablet Take 2 tablets (8 mg) by mouth daily for 12 doses Start on Day 2 of Cycles 1 through 4. 18 tablet 3     Any other pertinent information (if none, N/A): N/A    Refilled: Y/N, if NO, why?    no

## 2024-06-11 ENCOUNTER — APPOINTMENT (OUTPATIENT)
Dept: UROLOGY | Facility: CLINIC | Age: 84
End: 2024-06-11
Payer: COMMERCIAL

## 2024-06-11 VITALS
SYSTOLIC BLOOD PRESSURE: 148 MMHG | HEIGHT: 71 IN | BODY MASS INDEX: 25.9 KG/M2 | HEART RATE: 66 BPM | WEIGHT: 185 LBS | OXYGEN SATURATION: 95 % | TEMPERATURE: 97.9 F | DIASTOLIC BLOOD PRESSURE: 78 MMHG

## 2024-06-11 DIAGNOSIS — N13.8 BENIGN PROSTATIC HYPERPLASIA WITH LOWER URINARY TRACT SYMPMS: ICD-10-CM

## 2024-06-11 DIAGNOSIS — N40.1 BENIGN PROSTATIC HYPERPLASIA WITH LOWER URINARY TRACT SYMPMS: ICD-10-CM

## 2024-06-11 PROCEDURE — G2211 COMPLEX E/M VISIT ADD ON: CPT

## 2024-06-11 PROCEDURE — 99203 OFFICE O/P NEW LOW 30 MIN: CPT | Mod: 25

## 2024-06-11 PROCEDURE — 99213 OFFICE O/P EST LOW 20 MIN: CPT | Mod: 25

## 2024-06-11 PROCEDURE — 51798 US URINE CAPACITY MEASURE: CPT

## 2024-06-11 RX ORDER — FINASTERIDE 5 MG/1
5 TABLET, FILM COATED ORAL DAILY
Qty: 90 | Refills: 3 | Status: ACTIVE | COMMUNITY
Start: 1900-01-01 | End: 1900-01-01

## 2024-06-11 RX ORDER — SILODOSIN 8 MG/1
8 CAPSULE ORAL
Qty: 90 | Refills: 3 | Status: ACTIVE | COMMUNITY
Start: 1900-01-01 | End: 1900-01-01

## 2024-06-11 NOTE — PHYSICAL EXAM
[Normal Appearance] : normal appearance [General Appearance - In No Acute Distress] : no acute distress [Heart Rate And Rhythm] : heart rate and rhythm were normal [] : no respiratory distress [Abdomen Soft] : soft [Normal Station and Gait] : the gait and station were normal for the patient's age [Skin Color & Pigmentation] : normal skin color and pigmentation [No Focal Deficits] : no focal deficits [Oriented To Time, Place, And Person] : oriented to person, place, and time [Not Anxious] : not anxious

## 2024-06-11 NOTE — HISTORY OF PRESENT ILLNESS
[FreeTextEntry1] : 9/7/23: 82 yo male presents for continuity of care after his urologist retired.  He has a hx of BPH with a massive 400 cc prostate.  He has a hx of episodes of AUR, most recently in 2020.  He is managed with dual medical therapy (silodosin and finasteride).  He has been feeling well urologically without any complaints.  He denies dysuria, hematuria or inability to void.    PVR = 69 cc (LUTS) IPSS = 5; QoL = 0  **************** 6/11/24: Patient returns for follow up.  He is voiding at his baseline on dual therapy.  He needs refills of his BPH meds.   PVR = 45 cc

## 2024-06-11 NOTE — ASSESSMENT
[FreeTextEntry1] : 84 yo male with massive prostate voiding well on dual therapy.  Will refill his meds.  He will RTC in 1 year or sooner of needed.

## 2024-07-09 ENCOUNTER — APPOINTMENT (OUTPATIENT)
Dept: UROLOGY | Facility: CLINIC | Age: 84
End: 2024-07-09

## 2025-05-09 ENCOUNTER — OUTPATIENT (OUTPATIENT)
Dept: OUTPATIENT SERVICES | Facility: HOSPITAL | Age: 85
LOS: 1 days | End: 2025-05-09

## 2025-05-09 ENCOUNTER — APPOINTMENT (OUTPATIENT)
Dept: OPHTHALMOLOGY | Facility: CLINIC | Age: 85
End: 2025-05-09

## 2025-05-09 DIAGNOSIS — H26.9 UNSPECIFIED CATARACT: ICD-10-CM

## 2025-05-19 ENCOUNTER — RX RENEWAL (OUTPATIENT)
Age: 85
End: 2025-05-19

## 2025-06-09 ENCOUNTER — APPOINTMENT (OUTPATIENT)
Dept: UROLOGY | Facility: CLINIC | Age: 85
End: 2025-06-09
Payer: COMMERCIAL

## 2025-06-09 ENCOUNTER — NON-APPOINTMENT (OUTPATIENT)
Age: 85
End: 2025-06-09

## 2025-06-09 VITALS
DIASTOLIC BLOOD PRESSURE: 77 MMHG | SYSTOLIC BLOOD PRESSURE: 130 MMHG | HEIGHT: 71 IN | BODY MASS INDEX: 25.2 KG/M2 | OXYGEN SATURATION: 97 % | WEIGHT: 180 LBS | HEART RATE: 80 BPM | TEMPERATURE: 97.3 F

## 2025-06-09 PROBLEM — M79.671 RIGHT FOOT PAIN: Status: ACTIVE | Noted: 2025-06-09

## 2025-06-09 PROBLEM — M79.672 FOOT PAIN, LEFT: Status: ACTIVE | Noted: 2025-06-09

## 2025-06-09 PROCEDURE — 99213 OFFICE O/P EST LOW 20 MIN: CPT

## 2025-06-09 PROCEDURE — G2211 COMPLEX E/M VISIT ADD ON: CPT | Mod: NC

## 2025-06-09 RX ORDER — LOSARTAN POTASSIUM 25 MG/1
25 TABLET, FILM COATED ORAL
Refills: 0 | Status: ACTIVE | COMMUNITY

## 2025-06-09 RX ORDER — FUROSEMIDE 20 MG/1
20 TABLET ORAL
Refills: 0 | Status: ACTIVE | COMMUNITY

## 2025-06-09 RX ORDER — SPIRONOLACTONE 25 MG/1
25 TABLET ORAL
Refills: 0 | Status: ACTIVE | COMMUNITY

## 2025-06-10 ENCOUNTER — TRANSCRIPTION ENCOUNTER (OUTPATIENT)
Age: 85
End: 2025-06-10

## 2025-06-10 ENCOUNTER — APPOINTMENT (OUTPATIENT)
Age: 85
End: 2025-06-10

## 2025-06-10 PROCEDURE — 99203 OFFICE O/P NEW LOW 30 MIN: CPT | Mod: 25

## 2025-06-10 PROCEDURE — 11056 PARNG/CUTG B9 HYPRKR LES 2-4: CPT

## 2025-06-23 ENCOUNTER — RX RENEWAL (OUTPATIENT)
Age: 85
End: 2025-06-23

## 2025-07-08 ENCOUNTER — APPOINTMENT (OUTPATIENT)
Dept: UROLOGY | Facility: CLINIC | Age: 85
End: 2025-07-08
Payer: COMMERCIAL

## 2025-07-08 ENCOUNTER — NON-APPOINTMENT (OUTPATIENT)
Age: 85
End: 2025-07-08

## 2025-07-08 VITALS
WEIGHT: 180 LBS | DIASTOLIC BLOOD PRESSURE: 75 MMHG | SYSTOLIC BLOOD PRESSURE: 155 MMHG | HEIGHT: 71 IN | HEART RATE: 62 BPM | TEMPERATURE: 97.4 F | BODY MASS INDEX: 25.2 KG/M2

## 2025-07-08 PROCEDURE — 51798 US URINE CAPACITY MEASURE: CPT

## 2025-07-08 PROCEDURE — 99213 OFFICE O/P EST LOW 20 MIN: CPT

## 2025-07-08 PROCEDURE — G2211 COMPLEX E/M VISIT ADD ON: CPT

## 2025-07-11 LAB — BACTERIA UR CULT: NORMAL

## 2025-08-14 ENCOUNTER — APPOINTMENT (OUTPATIENT)
Age: 85
End: 2025-08-14
Payer: MEDICARE

## 2025-08-14 DIAGNOSIS — M20.41 OTHER HAMMER TOE(S) (ACQUIRED), RIGHT FOOT: ICD-10-CM

## 2025-08-14 DIAGNOSIS — B35.1 TINEA UNGUIUM: ICD-10-CM

## 2025-08-14 DIAGNOSIS — L84 CORNS AND CALLOSITIES: ICD-10-CM

## 2025-08-14 DIAGNOSIS — L60.0 INGROWING NAIL: ICD-10-CM

## 2025-08-14 PROCEDURE — 11721 DEBRIDE NAIL 6 OR MORE: CPT

## 2025-08-14 PROCEDURE — 99203 OFFICE O/P NEW LOW 30 MIN: CPT | Mod: 25
